# Patient Record
Sex: MALE | Race: WHITE | NOT HISPANIC OR LATINO | Employment: FULL TIME | URBAN - METROPOLITAN AREA
[De-identification: names, ages, dates, MRNs, and addresses within clinical notes are randomized per-mention and may not be internally consistent; named-entity substitution may affect disease eponyms.]

---

## 2017-03-10 ENCOUNTER — OFFICE VISIT - HEALTHEAST (OUTPATIENT)
Dept: INTERNAL MEDICINE | Facility: CLINIC | Age: 57
End: 2017-03-10

## 2017-03-10 DIAGNOSIS — R59.9 LYMPH NODE ENLARGEMENT: ICD-10-CM

## 2017-03-10 DIAGNOSIS — H61.22 IMPACTED CERUMEN OF LEFT EAR: ICD-10-CM

## 2017-03-10 DIAGNOSIS — Z12.5 PROSTATE CANCER SCREENING: ICD-10-CM

## 2017-03-10 LAB — PSA SERPL-MCNC: 1 NG/ML (ref 0–3.5)

## 2017-03-10 ASSESSMENT — MIFFLIN-ST. JEOR: SCORE: 1720.25

## 2017-03-14 ENCOUNTER — AMBULATORY - HEALTHEAST (OUTPATIENT)
Dept: LAB | Facility: CLINIC | Age: 57
End: 2017-03-14

## 2017-03-14 DIAGNOSIS — Z12.11 SPECIAL SCREENING FOR MALIGNANT NEOPLASMS, COLON: ICD-10-CM

## 2017-03-20 ENCOUNTER — COMMUNICATION - HEALTHEAST (OUTPATIENT)
Dept: INTERNAL MEDICINE | Facility: CLINIC | Age: 57
End: 2017-03-20

## 2017-04-18 ENCOUNTER — OFFICE VISIT - HEALTHEAST (OUTPATIENT)
Dept: INTERNAL MEDICINE | Facility: CLINIC | Age: 57
End: 2017-04-18

## 2017-04-18 ENCOUNTER — COMMUNICATION - HEALTHEAST (OUTPATIENT)
Dept: INTERNAL MEDICINE | Facility: CLINIC | Age: 57
End: 2017-04-18

## 2017-04-18 DIAGNOSIS — R59.9 LYMPH NODE ENLARGEMENT: ICD-10-CM

## 2017-04-26 ENCOUNTER — HOSPITAL ENCOUNTER (OUTPATIENT)
Dept: CT IMAGING | Facility: CLINIC | Age: 57
Discharge: HOME | End: 2017-04-26
Attending: INTERNAL MEDICINE

## 2017-04-26 DIAGNOSIS — R59.9 LYMPH NODE ENLARGEMENT: ICD-10-CM

## 2017-04-27 ENCOUNTER — COMMUNICATION - HEALTHEAST (OUTPATIENT)
Dept: INTERNAL MEDICINE | Facility: CLINIC | Age: 57
End: 2017-04-27

## 2017-04-27 DIAGNOSIS — D49.0 PAROTID TUMOR: ICD-10-CM

## 2017-04-27 DIAGNOSIS — R59.9 ENLARGED LYMPH NODE: ICD-10-CM

## 2017-05-03 ENCOUNTER — RECORDS - HEALTHEAST (OUTPATIENT)
Dept: ADMINISTRATIVE | Facility: OTHER | Age: 57
End: 2017-05-03

## 2017-05-04 ENCOUNTER — RECORDS - HEALTHEAST (OUTPATIENT)
Dept: ADMINISTRATIVE | Facility: OTHER | Age: 57
End: 2017-05-04

## 2017-05-08 ENCOUNTER — HOSPITAL ENCOUNTER (OUTPATIENT)
Dept: ULTRASOUND IMAGING | Facility: CLINIC | Age: 57
Discharge: HOME | End: 2017-05-08
Attending: OTOLARYNGOLOGY

## 2017-05-08 DIAGNOSIS — R59.0 CERVICAL LYMPHADENOPATHY: ICD-10-CM

## 2017-05-10 LAB
LAB AP CHARGES (HE HISTORICAL CONVERSION): ABNORMAL
LAB AP INITIAL CYTO EVAL (HE HISTORICAL CONVERSION): ABNORMAL
LAB MED GENERAL PATH INTERP (HE HISTORICAL CONVERSION): ABNORMAL
PATH REPORT.COMMENTS IMP SPEC: ABNORMAL
PATH REPORT.COMMENTS IMP SPEC: ABNORMAL
PATH REPORT.FINAL DX SPEC: ABNORMAL
PATH REPORT.MICROSCOPIC SPEC OTHER STN: ABNORMAL
PATH REPORT.RELEVANT HX SPEC: ABNORMAL
RESULT FLAG (HE HISTORICAL CONVERSION): ABNORMAL
SPECIMEN DESCRIPTION: ABNORMAL

## 2017-05-16 ENCOUNTER — COMMUNICATION - HEALTHEAST (OUTPATIENT)
Dept: INTERNAL MEDICINE | Facility: CLINIC | Age: 57
End: 2017-05-16

## 2017-05-24 ENCOUNTER — COMMUNICATION - HEALTHEAST (OUTPATIENT)
Dept: INTERNAL MEDICINE | Facility: CLINIC | Age: 57
End: 2017-05-24

## 2017-05-24 ENCOUNTER — OFFICE VISIT - HEALTHEAST (OUTPATIENT)
Dept: INTERNAL MEDICINE | Facility: CLINIC | Age: 57
End: 2017-05-24

## 2017-05-24 DIAGNOSIS — D17.9 LIPOMA: ICD-10-CM

## 2017-05-24 DIAGNOSIS — Z01.818 PRE-OPERATIVE EXAMINATION FOR INTERNAL MEDICINE: ICD-10-CM

## 2017-05-24 ASSESSMENT — MIFFLIN-ST. JEOR: SCORE: 1720.25

## 2017-05-25 ENCOUNTER — SURGERY - HEALTHEAST (OUTPATIENT)
Dept: SURGERY | Facility: CLINIC | Age: 57
End: 2017-05-25

## 2017-05-25 ENCOUNTER — ANESTHESIA - HEALTHEAST (OUTPATIENT)
Dept: SURGERY | Facility: CLINIC | Age: 57
End: 2017-05-25

## 2017-05-25 LAB
ATRIAL RATE - MUSE: 85 BPM
DIASTOLIC BLOOD PRESSURE - MUSE: NORMAL MMHG
INTERPRETATION ECG - MUSE: NORMAL
P AXIS - MUSE: 62 DEGREES
PR INTERVAL - MUSE: 138 MS
QRS DURATION - MUSE: 102 MS
QT - MUSE: 372 MS
QTC - MUSE: 442 MS
R AXIS - MUSE: 59 DEGREES
SYSTOLIC BLOOD PRESSURE - MUSE: NORMAL MMHG
T AXIS - MUSE: 57 DEGREES
VENTRICULAR RATE- MUSE: 85 BPM

## 2017-05-25 ASSESSMENT — MIFFLIN-ST. JEOR: SCORE: 1689.86

## 2017-05-30 ENCOUNTER — OFFICE VISIT - HEALTHEAST (OUTPATIENT)
Dept: INTERNAL MEDICINE | Facility: CLINIC | Age: 57
End: 2017-05-30

## 2017-05-30 DIAGNOSIS — T14.8XXA HEMATOMA: ICD-10-CM

## 2017-06-06 ENCOUNTER — OFFICE VISIT - HEALTHEAST (OUTPATIENT)
Dept: OTOLARYNGOLOGY | Facility: CLINIC | Age: 57
End: 2017-06-06

## 2017-06-06 ASSESSMENT — MIFFLIN-ST. JEOR: SCORE: 1717.98

## 2017-06-07 ENCOUNTER — COMMUNICATION - HEALTHEAST (OUTPATIENT)
Dept: ONCOLOGY | Facility: HOSPITAL | Age: 57
End: 2017-06-07

## 2017-06-13 ENCOUNTER — OFFICE VISIT - HEALTHEAST (OUTPATIENT)
Dept: OTOLARYNGOLOGY | Facility: CLINIC | Age: 57
End: 2017-06-13

## 2017-06-13 DIAGNOSIS — R06.02 SHORTNESS OF BREATH: ICD-10-CM

## 2017-06-13 ASSESSMENT — MIFFLIN-ST. JEOR: SCORE: 1717.98

## 2017-06-28 ENCOUNTER — OFFICE VISIT - HEALTHEAST (OUTPATIENT)
Dept: RADIATION ONCOLOGY | Facility: CLINIC | Age: 57
End: 2017-06-28

## 2017-06-28 ASSESSMENT — MIFFLIN-ST. JEOR: SCORE: 1696.66

## 2017-07-06 ENCOUNTER — COMMUNICATION - HEALTHEAST (OUTPATIENT)
Dept: RADIATION ONCOLOGY | Facility: HOSPITAL | Age: 57
End: 2017-07-06

## 2017-07-07 ENCOUNTER — HOSPITAL ENCOUNTER (OUTPATIENT)
Dept: PET IMAGING | Facility: HOSPITAL | Age: 57
Setting detail: RADIATION/ONCOLOGY SERIES
Discharge: STILL ADMITTED OR ADMINISTRATIVE | End: 2017-07-07
Attending: RADIOLOGY

## 2017-07-07 DIAGNOSIS — C80.1 MALIGNANT (PRIMARY) NEOPLASM, UNSPECIFIED (H): ICD-10-CM

## 2017-07-12 ENCOUNTER — AMBULATORY - HEALTHEAST (OUTPATIENT)
Dept: RADIATION ONCOLOGY | Facility: HOSPITAL | Age: 57
End: 2017-07-12

## 2017-07-12 ENCOUNTER — AMBULATORY - HEALTHEAST (OUTPATIENT)
Dept: ONCOLOGY | Facility: HOSPITAL | Age: 57
End: 2017-07-12

## 2017-07-12 LAB
CREAT SERPL-MCNC: 0.91 MG/DL (ref 0.7–1.3)
GFR SERPL CREATININE-BSD FRML MDRD: >60 ML/MIN/1.73M2

## 2017-07-20 ENCOUNTER — COMMUNICATION - HEALTHEAST (OUTPATIENT)
Dept: ADMINISTRATIVE | Facility: HOSPITAL | Age: 57
End: 2017-07-20

## 2017-07-20 ENCOUNTER — COMMUNICATION - HEALTHEAST (OUTPATIENT)
Dept: ONCOLOGY | Facility: HOSPITAL | Age: 57
End: 2017-07-20

## 2017-07-26 ENCOUNTER — OFFICE VISIT - HEALTHEAST (OUTPATIENT)
Dept: RADIATION ONCOLOGY | Facility: CLINIC | Age: 57
End: 2017-07-26

## 2017-07-26 ASSESSMENT — MIFFLIN-ST. JEOR: SCORE: 1715.71

## 2017-07-31 ENCOUNTER — AMBULATORY - HEALTHEAST (OUTPATIENT)
Dept: ONCOLOGY | Facility: HOSPITAL | Age: 57
End: 2017-07-31

## 2017-08-02 ENCOUNTER — OFFICE VISIT - HEALTHEAST (OUTPATIENT)
Dept: RADIATION ONCOLOGY | Facility: CLINIC | Age: 57
End: 2017-08-02

## 2017-08-02 ASSESSMENT — MIFFLIN-ST. JEOR: SCORE: 1709.36

## 2017-08-07 ENCOUNTER — AMBULATORY - HEALTHEAST (OUTPATIENT)
Dept: ONCOLOGY | Facility: HOSPITAL | Age: 57
End: 2017-08-07

## 2017-08-09 ENCOUNTER — OFFICE VISIT - HEALTHEAST (OUTPATIENT)
Dept: RADIATION ONCOLOGY | Facility: CLINIC | Age: 57
End: 2017-08-09

## 2017-08-16 ENCOUNTER — AMBULATORY - HEALTHEAST (OUTPATIENT)
Dept: ONCOLOGY | Facility: HOSPITAL | Age: 57
End: 2017-08-16

## 2017-08-16 ENCOUNTER — OFFICE VISIT - HEALTHEAST (OUTPATIENT)
Dept: RADIATION ONCOLOGY | Facility: CLINIC | Age: 57
End: 2017-08-16

## 2017-08-16 ASSESSMENT — MIFFLIN-ST. JEOR: SCORE: 1700.74

## 2017-08-23 ENCOUNTER — OFFICE VISIT - HEALTHEAST (OUTPATIENT)
Dept: RADIATION ONCOLOGY | Facility: CLINIC | Age: 57
End: 2017-08-23

## 2017-08-30 ENCOUNTER — AMBULATORY - HEALTHEAST (OUTPATIENT)
Dept: RADIATION ONCOLOGY | Facility: CLINIC | Age: 57
End: 2017-08-30

## 2017-08-30 ENCOUNTER — OFFICE VISIT - HEALTHEAST (OUTPATIENT)
Dept: RADIATION ONCOLOGY | Facility: CLINIC | Age: 57
End: 2017-08-30

## 2017-09-01 ENCOUNTER — AMBULATORY - HEALTHEAST (OUTPATIENT)
Dept: RADIATION ONCOLOGY | Facility: CLINIC | Age: 57
End: 2017-09-01

## 2017-09-12 ENCOUNTER — COMMUNICATION - HEALTHEAST (OUTPATIENT)
Dept: RADIATION ONCOLOGY | Facility: CLINIC | Age: 57
End: 2017-09-12

## 2017-09-28 ENCOUNTER — OFFICE VISIT - HEALTHEAST (OUTPATIENT)
Dept: RADIATION ONCOLOGY | Facility: CLINIC | Age: 57
End: 2017-09-28

## 2017-09-28 ASSESSMENT — MIFFLIN-ST. JEOR: SCORE: 1675.8

## 2017-10-03 ENCOUNTER — OFFICE VISIT - HEALTHEAST (OUTPATIENT)
Dept: OTOLARYNGOLOGY | Facility: CLINIC | Age: 57
End: 2017-10-03

## 2017-12-05 ENCOUNTER — OFFICE VISIT - HEALTHEAST (OUTPATIENT)
Dept: OTOLARYNGOLOGY | Facility: CLINIC | Age: 57
End: 2017-12-05

## 2017-12-15 ENCOUNTER — HOSPITAL ENCOUNTER (OUTPATIENT)
Dept: PET IMAGING | Facility: HOSPITAL | Age: 57
Discharge: HOME | End: 2017-12-15
Attending: OTOLARYNGOLOGY

## 2017-12-15 DIAGNOSIS — C44.92 SQUAMOUS CELL CARCINOMA OF SKIN, UNSPECIFIED: ICD-10-CM

## 2018-01-02 ENCOUNTER — OFFICE VISIT - HEALTHEAST (OUTPATIENT)
Dept: OTOLARYNGOLOGY | Facility: CLINIC | Age: 58
End: 2018-01-02

## 2018-03-06 ENCOUNTER — OFFICE VISIT - HEALTHEAST (OUTPATIENT)
Dept: OTOLARYNGOLOGY | Facility: CLINIC | Age: 58
End: 2018-03-06

## 2018-04-03 ENCOUNTER — OFFICE VISIT - HEALTHEAST (OUTPATIENT)
Dept: OTOLARYNGOLOGY | Facility: CLINIC | Age: 58
End: 2018-04-03

## 2018-05-08 ENCOUNTER — OFFICE VISIT - HEALTHEAST (OUTPATIENT)
Dept: OTOLARYNGOLOGY | Facility: CLINIC | Age: 58
End: 2018-05-08

## 2018-06-12 ENCOUNTER — OFFICE VISIT - HEALTHEAST (OUTPATIENT)
Dept: OTOLARYNGOLOGY | Facility: CLINIC | Age: 58
End: 2018-06-12

## 2018-07-17 ENCOUNTER — OFFICE VISIT - HEALTHEAST (OUTPATIENT)
Dept: OTOLARYNGOLOGY | Facility: CLINIC | Age: 58
End: 2018-07-17

## 2018-08-14 ENCOUNTER — OFFICE VISIT - HEALTHEAST (OUTPATIENT)
Dept: OTOLARYNGOLOGY | Facility: CLINIC | Age: 58
End: 2018-08-14

## 2018-08-22 ENCOUNTER — HOSPITAL ENCOUNTER (OUTPATIENT)
Dept: PET IMAGING | Facility: HOSPITAL | Age: 58
Discharge: HOME | End: 2018-08-22
Attending: OTOLARYNGOLOGY

## 2018-08-22 DIAGNOSIS — C44.92 SQUAMOUS CELL CARCINOMA OF SKIN, UNSPECIFIED: ICD-10-CM

## 2018-08-22 LAB — GLUCOSE BLDC GLUCOMTR-MCNC: 113 MG/DL

## 2018-08-22 ASSESSMENT — MIFFLIN-ST. JEOR: SCORE: 1686.23

## 2018-09-11 ENCOUNTER — OFFICE VISIT - HEALTHEAST (OUTPATIENT)
Dept: OTOLARYNGOLOGY | Facility: CLINIC | Age: 58
End: 2018-09-11

## 2020-01-27 ENCOUNTER — COMMUNICATION - HEALTHEAST (OUTPATIENT)
Dept: TELEHEALTH | Facility: CLINIC | Age: 60
End: 2020-01-27

## 2020-01-27 ENCOUNTER — RECORDS - HEALTHEAST (OUTPATIENT)
Dept: GENERAL RADIOLOGY | Facility: CLINIC | Age: 60
End: 2020-01-27

## 2020-01-27 ENCOUNTER — OFFICE VISIT - HEALTHEAST (OUTPATIENT)
Dept: INTERNAL MEDICINE | Facility: CLINIC | Age: 60
End: 2020-01-27

## 2020-01-27 DIAGNOSIS — M54.2 CERVICALGIA: ICD-10-CM

## 2020-01-27 DIAGNOSIS — Z12.11 SCREEN FOR COLON CANCER: ICD-10-CM

## 2020-01-27 DIAGNOSIS — C77.9 METASTATIC SQUAMOUS CELL CARCINOMA TO LYMPH NODE (H): ICD-10-CM

## 2020-01-27 ASSESSMENT — MIFFLIN-ST. JEOR: SCORE: 1727.05

## 2021-05-30 VITALS — WEIGHT: 201.5 LBS | BODY MASS INDEX: 28.91 KG/M2

## 2021-05-30 VITALS — BODY MASS INDEX: 27.89 KG/M2 | HEIGHT: 70 IN | WEIGHT: 194.8 LBS

## 2021-05-30 VITALS — HEIGHT: 70 IN | BODY MASS INDEX: 28.77 KG/M2 | WEIGHT: 201 LBS

## 2021-05-30 VITALS — HEIGHT: 70 IN | WEIGHT: 201.5 LBS | BODY MASS INDEX: 28.85 KG/M2

## 2021-05-30 VITALS — WEIGHT: 200 LBS | BODY MASS INDEX: 28.7 KG/M2

## 2021-05-31 VITALS — WEIGHT: 200.5 LBS | HEIGHT: 70 IN | BODY MASS INDEX: 28.71 KG/M2

## 2021-05-31 VITALS — WEIGHT: 199 LBS | BODY MASS INDEX: 28.55 KG/M2

## 2021-05-31 VITALS — HEIGHT: 70 IN | WEIGHT: 199.1 LBS | BODY MASS INDEX: 28.5 KG/M2

## 2021-05-31 VITALS — HEIGHT: 70 IN | WEIGHT: 191.7 LBS | BODY MASS INDEX: 27.44 KG/M2

## 2021-05-31 VITALS — WEIGHT: 194.4 LBS | BODY MASS INDEX: 27.89 KG/M2

## 2021-05-31 VITALS — BODY MASS INDEX: 28.1 KG/M2 | HEIGHT: 70 IN | WEIGHT: 196.3 LBS

## 2021-05-31 VITALS — BODY MASS INDEX: 28.09 KG/M2 | WEIGHT: 195.8 LBS

## 2021-05-31 VITALS — BODY MASS INDEX: 28.23 KG/M2 | HEIGHT: 70 IN | WEIGHT: 197.2 LBS

## 2021-05-31 VITALS — BODY MASS INDEX: 28.77 KG/M2 | WEIGHT: 201 LBS | HEIGHT: 70 IN

## 2021-06-01 VITALS — WEIGHT: 194 LBS | BODY MASS INDEX: 27.77 KG/M2 | HEIGHT: 70 IN

## 2021-06-04 VITALS
DIASTOLIC BLOOD PRESSURE: 72 MMHG | HEIGHT: 70 IN | HEART RATE: 78 BPM | SYSTOLIC BLOOD PRESSURE: 124 MMHG | WEIGHT: 203 LBS | BODY MASS INDEX: 29.06 KG/M2 | OXYGEN SATURATION: 98 %

## 2021-06-05 NOTE — PROGRESS NOTES
ASSESSMENT AND PLAN:    1. 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  Clinically and on xray no indication of recurrence.     2. Cervicalgia  This is myofascial and a postural strain related likely to his 9 hours per day .  Xrays reveal DJD normal for age, no indication of fracture, or tumor, or malalignment. He plans to see chiropractor, and will consider yoga or exercise for prevention.   - XR Cervical Spine 2 - 3 VWS; Future  - XR Thoracic Spine 2 VWS; Future    3. Preventive health  He agrees to be seen for general health evaluation later this year, and will schedule fasting appointment.     Patient Instructions   1. Discussed the postural syndrome.  Chiropractic can help. Follow up if fails to improve.     2. Follow up later this year for physical.     CHIEF COMPLAINT:  Chief Complaint   Patient presents with     Neck Pain     HISTORY OF PRESENT ILLNESS:  Lanre Godwin is a 59 y.o. male presents with neck pain.  This is localized to the low neck and upper central thoracic spine.  There are no arm symptoms of tingling or weakness.  He does drive truck 9 hours per day.  No trauma.  He feels that he often has to rotate and bend his neck frequently to 'loosen it up'.  Otherwise he feels well.     REVIEW OF SYSTEMS:   See HPI, all other systems on review are negative.    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 (H) 2020     Skin cancer 2016     Social History     Tobacco Use   Smoking Status Former Smoker     Packs/day: 1.00     Types: Cigarettes     Start date:      Last attempt to quit:      Years since quittin.0   Smokeless Tobacco Never Used     Family History   Problem Relation Age of Onset     Prostate cancer Father      Anesthesia problems Neg Hx      Past Surgical History:   Procedure  "Laterality Date     HEAD & NECK SKIN LESION EXCISIONAL BIOPSY      Keratoacanthoma     PAROTIDECTOMY Left 5/25/2017    Procedure: LEFT PAROTIDECTOMY;  Surgeon: Natalee Valenzuela MD;  Location: [facility];  Service:      RADICAL NECK DISSECTION Left 5/25/2017    Procedure: LEFT MODIFIED RADIAL NECK DISSECTION;  Surgeon: Natalee Valenzuela MD;  Location: [facility];  Service:      VITALS:  Vitals:    01/27/20 0901   BP: 124/72   Patient Site: Left Arm   Patient Position: Sitting   Cuff Size: Adult Regular   Pulse: 78   SpO2: 98%   Weight: 203 lb (92.1 kg)   Height: 5' 10\" (1.778 m)     Wt Readings from Last 3 Encounters:   01/27/20 203 lb (92.1 kg)   08/22/18 194 lb (88 kg)   09/28/17 191 lb 11.2 oz (87 kg)     PHYSICAL EXAM:  Constitutional:  In NAD, alert and oriented  Neck: no cervical or axillary adenopathy, healed left parotidectomy site.  Good ROM without elicitation of pain.  Some paracervical muscle spasm.      DECISION TO OBTAIN OLD RECORDS AND/OR OBTAIN HISTORY FROM SOMEONE OTHER THAN PATIENT, AND/OR ACCESSING CARE EVERYWHERE):  1  0     REVIEW AND SUMMARIZATION OF OLD RECORDS, AND/OR OBTAINING HISTORY FROM SOMEONE OTHER THAN PATIENT, AND/OR DISCUSSION OF CASE WITH ANOTHER HEALTH CARE PROVIDER:  2 0    REVIEW AND/OR ORDER OF OF CLINICAL LAB TESTS: 1 0    REVIEW AND/OR ORDER OF RADIOLOGY TESTS: 1 ordered    REVIEW AND/OR ORDER OF MEDICAL TESTS (EKG/ECHO/COLONOSCOPY/EGD): 1  0    INDEPENDENT  VISUALIZATION OF IMAGE, TRACING, OR SPECIMEN ITSELF (2 EACH):  4 personally viewed and interpreted the thoracic spine and cervical spine xrays.     TOTAL: 5    Burt Mejia MD  Internal Medicine  [facility]  "

## 2021-06-05 NOTE — PATIENT INSTRUCTIONS - HE
1. Discussed the postural syndrome.  Chiropractic can help. Follow up if fails to improve.     2. Follow up later this year for physical.

## 2021-06-09 NOTE — PROGRESS NOTES
ASSESSMENT:  1. Prostate cancer screening  Lanre Godwin is a 56-year-old man who presents to establish care.  His father had prostate cancer, paternal grandfather also had prostate cancer.  He occasionally has weak stream, but rectal exam is normal, AUA prostate score is fairly very low.  I recommended annual PSA screening for him.  - PSA (Prostatic-Specific Antigen), Annual Screen    2. Impacted cerumen of left ear  Attempted removal today, irrigated and clinic    3. Lymph node enlargement  Small BB sized lymph node at the angle of the left mandible representing shotty lymph node versus reactive lymph node.  It is mobile, tender, benign characteristics.  Check ultrasound if no improvement in 1 month, or if enlarging.         PLAN:  Patient Instructions   PSA screening today    Stool cards-- once a year for colon cancer screening    At a later date-- recommend fasting blood tests for diabetes and cholesterol screening       Orders Placed This Encounter   Procedures     PSA (Prostatic-Specific Antigen), Annual Screen     There are no discontinued medications.    Return for Annual physical.    CHIEF COMPLAINT:  Chief Complaint   Patient presents with     Lump on Left side of neck     Wants Prostate test     Establish Care       HISTORY OF PRESENT ILLNESS:  Lanre is a 56 y.o. male presenting to the clinic today to establish care and to discuss routine testing for prostate cancer. His father has prostate cancer currently being treated with radiation and his great grandfather also had prostate cancer. He does have an occasional weak stream, though he notes this is usually when he is trying to empty his bladder prior to driving versus at a time when he feels more urgent.     Health Maintenance: He is agreeable to cologuard. He is squeamish about colonoscopy.     REVIEW OF SYSTEMS:   He has had a skin cancer removed from his forehead.   He does back stretching exercises 5 days per week to keep his back supple. He  "works as a .   He would like an evaluation of a lump recently discovered on the left side of his neck. It is slightly tender.   All other systems are negative.    PFSH:  Father with prostate cancer currently being treated by radiation. Great grandfather also with prostate cancer although he lived a long life. He is a . 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 He has one daughter and 2 grandchildren. Other family history includes sister with brain abscess with shunt last year, father is healthy other than the cancer. He reports that he used to drink scotch and smoke, but he no longer does.   History   Smoking Status     Former Smoker   Smokeless Tobacco     Not on file       No family history on file.    Social History     Social History     Marital status:      Spouse name: N/A     Number of children: N/A     Years of education: N/A     Occupational History     Not on file.     Social History Main Topics     Smoking status: Former Smoker     Smokeless tobacco: Not on file     Alcohol use Not on file     Drug use: Not on file     Sexual activity: Not on file     Other Topics Concern     Not on file     Social History Narrative     No narrative on file       No past surgical history on file.    No Known Allergies    Active Ambulatory Problems     Diagnosis Date Noted     Prostate cancer screening 03/10/2017     Impacted cerumen of left ear 03/10/2017     Lymph node enlargement 03/10/2017     Resolved Ambulatory Problems     Diagnosis Date Noted     No Resolved Ambulatory Problems     No Additional Past Medical History       VITALS:  Vitals:    03/10/17 1053   BP: 110/66   Pulse: 80   Weight: 201 lb 8 oz (91.4 kg)   Height: 5' 10\" (1.778 m)     Wt Readings from Last 3 Encounters:   03/10/17 201 lb 8 oz (91.4 kg)     Body mass index is 28.91 kg/(m^2).    PHYSICAL EXAM:  General: Alert and talkative. Well-appearing man.   Ears: Left occluded with cerumen.   Mouth: Good dentition. "   Lymphatic: Firm BB at the angle of the left mandible, mobile.   Heart: Regular rate and rhythm. No murmur.   Lungs: Clear to ausculation.   : Prostate normal in contour; no nodules.   Psych: Pleasant affect.     ADDITIONAL HISTORY SUMMARIZED (2): None.  DECISION TO OBTAIN EXTRA INFORMATION (1): None.   RADIOLOGY TESTS (1): None.  LABS (1): Labs ordered.   MEDICINE TESTS (1): None.  INDEPENDENT REVIEW (2 each): None.       The visit lasted a total of 21 minutes face to face with the patient. Over 50% of the time was spent counseling and educating the patient about prostate cancer.    Omar FERRARI, am scribing for and in the presence of, Dr. Taylor.    IDr. Taylor, personally performed the services described in this documentation, as scribed by Omar Fiore in my presence, and it is both accurate and complete.    MEDICATIONS:  No current outpatient prescriptions on file.     No current facility-administered medications for this visit.        Total data points: 1

## 2021-06-10 NOTE — PROGRESS NOTES
ASSESSMENT:  1. Pre-operative examination for internal medicine  Lanre Godwin is a 56-year-old man who presents for preop assessment prior to left parotidectomy and left neck dissection.  Medical risks optimize, no further testing needed aside from hemogram.  EKG was normal.  He does not have symptoms of angina, RCRI score is 0.  He does not take any prescription medications, and was already advised to not take aspirin or NSAIDs prior to the surgery.  - Electrocardiogram Perform and Read  - HM2(CBC w/o Differential)    2. Lipoma  Is a small lipoma over the left acromion as well as a larger one in the center is back.  The one on the back has been present for many years.  I would recommend elective removal after recovery from #1      PLAN:  Patient Instructions   Good for surgery    Check hemoglobin today     Lipoma on left shoulder and back      Orders Placed This Encounter   Procedures     Tdap vaccine,  6yo or older,  IM     HM2(CBC w/o Differential)     Electrocardiogram Perform and Read     There are no discontinued medications.    Return for Next scheduled follow up.    CHIEF COMPLAINT:  Chief Complaint   Patient presents with     Pre-op Exam       HISTORY OF PRESENT ILLNESS:  Lanre is a 56 y.o. male here for an internal medicine pre-operative consultation. The exam is requested by Dr. Valenzuela in preparation for left parotidectomy to be performed at [facility] on May 25th, 2017. Today s examination on 5/24/2017 is done to review the underlying surgical condition of malignant neoplasm of the neck, clear for anesthesia, and review medical problems with appropriate changes in medications.    He saw Dr. Tierney earlier this month after the small growth on the left neck grew. The neck has been tender when he rests his head, preventing much restful sleep for the past few weeks. Dr. Tierney noted a prominent firm left submandibular node along with large posterior cervical lymph node on the left and prominent  swelling anterior to the left ear as well.   He had a CT which showed 14 mm, ill-defined, likely partially necrotic soft tissue lesion within the superficial left parotid gland suspicious for either intraparotid jose e metastasis or primary parotid neoplasm.   Additionally, there was a large, presumably necrotic, lymph node conglomerate in the left spinal accessory jose e chain deep to the left upper sternocleidomastoid muscle seen on the CT. Both aspiration smears showed tumor tissue with conventional keratinizing squamous cell carcinoma.     Lanre has tolerated previous surgeries well without bleeding or anesthesia difficulty.      REVIEW OF SYSTEMS:   As above, he has been feeling tired due to lack of sleep. He denies any shortness of breath, cough or chest pain.   Lipoma:  He has noticed a small protrusion on his left shoulder. Upon palpitation, it feels as though there is fluid beneath the small protrusion, very similar to lipomas he has had in the past.   Anxiety:  He becomes a bit short of breath when feeling anxious. He denies any decreased exercise tolerance.   All other systems are negative.    PFSH:  He has no history of surgery.   History   Smoking Status     Former Smoker     Packs/day: 1.00     Types: Cigarettes     Start date: 1980     Quit date: 2007   Smokeless Tobacco     Not on file       Family History   Problem Relation Age of Onset     Prostate cancer Father      Anesthesia problems Neg Hx        Past Surgical History:   Procedure Laterality Date     HEAD & NECK SKIN LESION EXCISIONAL BIOPSY      Keratoacanthoma       No Known Allergies    Active Ambulatory Problems     Diagnosis Date Noted     Prostate cancer screening 03/10/2017     Impacted cerumen of left ear 03/10/2017     Lymph node enlargement 03/10/2017     Lipoma 05/24/2017     Resolved Ambulatory Problems     Diagnosis Date Noted     No Resolved Ambulatory Problems     Past Medical History:   Diagnosis Date     Keratoacanthoma      Skin  "cancer 2016       VITALS:  Vitals:    05/24/17 0939   BP: 112/60   Pulse: 88   Weight: 201 lb 8 oz (91.4 kg)   Height: 5' 10\" (1.778 m)     Wt Readings from Last 3 Encounters:   05/24/17 201 lb 8 oz (91.4 kg)   04/18/17 200 lb (90.7 kg)   03/10/17 201 lb 8 oz (91.4 kg)       PHYSICAL EXAM:  General:  Alert, pleasant, no distress   ENT: Good dentition.   Neck:  Large firm mass at the angle of the left mandible and just posterior to that.   Heart:  Regular rate and rhythm, no murmurs.   Lungs:  Clear.    Skin: Large lipoma in the center of the back. Small 1 cm lipoma over the left acromion.   EKG: Normal sinus rhythm, no ischemic changes or conduction abnormalities.     ADDITIONAL HISTORY SUMMARIZED (2): Notes from Dr. Tierney reviewed from 5/2017 regarding neck swelling.  DECISION TO OBTAIN EXTRA INFORMATION (1): None.   RADIOLOGY TESTS (1): CT of the neck reviewed from 4/2017. US reviewed from 5/8/2017.  LABS (1): Labs ordered.  MEDICINE TESTS (1): EKG ordered.  INDEPENDENT REVIEW (2 each): EKG personally interpreted.     The visit lasted a total of 20 minutes face to face with the patient. Over 50% of the time was spent counseling and educating the patient about pre-operative measures.    IAgustín, am scribing for and in the presence of Dr. Taylor.    IDr. Taylor, personally performed the services described in this documentation, as scribed by Agustín Land in my presence, and it is both accurate and complete.    MEDICATIONS:  No current outpatient prescriptions on file.     No current facility-administered medications for this visit.        Total data points: 7.     Agustín Taylor, DO  Internal Medicine  [facility]    "

## 2021-06-10 NOTE — PROGRESS NOTES
ASSESSMENT:  1. 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  ARCHIE drain was removed in clinic today around 9:30 AM, but he presented around 11:45 AM has a walk-in asking for Dr. Valenzuela.  It was reported that she had left for the day, and I saw him myself.  He described rapid increase in swelling around the parotid gland, also blood coming from somewhere on the side of his neck.  He could not tell where.  Exam reveals fresh bright red blood from the site of the drain, could be expressed, but was not briskly bleeding.  I dressed this with a an abdominal pad, and also dressed a small area of oozing just inferior to the left ear.  Clinically, symptoms and signs are consistent with hematoma.  I taken pictures for reference.  I did discuss with Dr. Valenzuela, who arranged appointment with 1 of her partners in her private office near [facility].    2. Squamous cell carcinoma  Pathology pending from surgery on 5/25        PLAN:  See Dr. Valenzuela's partner, address provided    No orders of the defined types were placed in this encounter.    Medications Discontinued During This Encounter   Medication Reason     HYDROcodone-acetaminophen 5-325 mg per tablet Therapy completed       Return if symptoms worsen or fail to improve, for Next scheduled follow up.    CHIEF COMPLAINT:  Chief Complaint   Patient presents with     Jaw Pain     jaw swelling and bleeding from incision site       HISTORY OF PRESENT ILLNESS:  Lanre is a 56 y.o. male presenting to the clinic today with complaints of jaw pain. He recently underwent left parotidectomy and left neck dissection on 5/25/2017 by Dr. Valenzuela. The surgery revealed diffuse lymphadenopathy involving the parotid jose e basin, jugulodigastric chain, and posterolateral neck on the left. He had the drain site removed this morning and about 20  "minutes later, at around 9 AM, he began to experience swelling and pain at the incision site and noticed blood being expressed from the incision site. He tried to staunch the blood flow with paper towels, roughly 6 of them, but had some difficulty fully stopping the blood flow. Of note, he took an ibuprofen earlier this morning. During the office visit he reports that the pain has lessened significantly and near the end of the visit the jaw is more numb than painful.     REVIEW OF SYSTEMS:   Since the operation, chewing has been difficult and his jaw has felt \"off\" but overall he has not had a great deal of pain.   All other systems are negative.    PFSH:  Reviewed, as above.     TOBACCO USE:  History   Smoking Status     Former Smoker     Packs/day: 1.00     Types: Cigarettes     Start date: 1980     Quit date: 2007   Smokeless Tobacco     Never Used       VITALS:  Vitals:    05/30/17 1144   BP: 124/70   Pulse: 68   Weight: 201 lb 8 oz (91.4 kg)     Wt Readings from Last 3 Encounters:   05/30/17 201 lb 8 oz (91.4 kg)   05/25/17 194 lb 12.8 oz (88.4 kg)   05/24/17 201 lb 8 oz (91.4 kg)       PHYSICAL EXAM:  General:  Alert, pleasant.  Skin: 6x7 cm area of diffuse swelling around the angle of the mandible, incision from anterior ear down the SCM appears intact, there is a small amount of fresh blood just inferior to the left ear, fresh red blood can be expressed from the drain site.   Neuro: Slight weakness in left side of the face with smiling.     ADDITIONAL HISTORY SUMMARIZED (2): Operative notes from Dr. Valenzuela from 5/25/2017 reviewed regarding lymph adenopathy.  DECISION TO OBTAIN EXTRA INFORMATION (1): Called placed to Dr. Valenzuela for recommendations on management of bleeding.   RADIOLOGY TESTS (1): None.  LABS (1): Labs from 5/24/2017 reviewed.  MEDICINE TESTS (1): None.  INDEPENDENT REVIEW (2 each): None.     The visit lasted a total of 36 minutes face to face with the patient. Over 50% of the time was spent " counseling and educating the patient about neoplasm.    I, Agustín Land, am scribing for and in the presence of, Dr. Taylor.    I, Dr. Taylor, personally performed the services described in this documentation, as scribed by Agustín Land in my presence, and it is both accurate and complete.    MEDICATIONS:  Current Outpatient Prescriptions   Medication Sig Dispense Refill     cephalexin (KEFLEX) 500 MG capsule Take 1 capsule (500 mg total) by mouth 2 (two) times a day for 5 days. 10 capsule 0     ibuprofen (ADVIL,MOTRIN) 600 MG tablet Take 1 tablet (600 mg total) by mouth every 6 (six) hours as needed for pain. 60 tablet 2     methylPREDNISolone (MEDROL DOSEPACK) 4 mg tablet follow package directions 21 tablet 0     No current facility-administered medications for this visit.        Total data points: 4.     Agustín Taylor,   Internal Medicine  [facility]

## 2021-06-10 NOTE — ANESTHESIA POSTPROCEDURE EVALUATION
Patient: Lanre Godwin  LEFT PAROTIDECTOMY, LEFT MODIFIED RADIAL NECK DISSECTION  Anesthesia type: general    Patient location: PACU  Last vitals:   Vitals:    05/25/17 1815   BP: 118/70   Pulse: 93   Resp: 16   Temp:    SpO2: 99%     Post vital signs: stable  Level of consciousness: awake, alert and responds to simple questions  Post-anesthesia pain: pain controlled  Post-anesthesia nausea and vomiting: no  Pulmonary: unassisted, return to baseline  Cardiovascular: stable and blood pressure at baseline  Hydration: adequate  Anesthetic events: no    QCDR Measures:  ASA# 11 - Jesica-op Cardiac Arrest: ASA11B - Patient did NOT experience unanticipated cardiac arrest  ASA# 12 - Jesica-op Mortality Rate: ASA12B - Patient did NOT die  ASA# 13 - PACU Re-Intubation Rate: ASA13B - Patient did NOT require a new airway mgmt  ASA# 10 - Composite Anes Safety: ASA10A - No serious adverse event  ASA# 38 - New Corneal Injury: ASA38A - No new exposure keratitis or corneal abrasion in PACU    Additional Notes:

## 2021-06-10 NOTE — ANESTHESIA CARE TRANSFER NOTE
Last vitals:   Vitals:    05/25/17 1753   BP: 122/66   Pulse: 91   Resp: 16   Temp: 36.8  C (98.3  F)   SpO2: 99%     Patient's level of consciousness is drowsy  Spontaneous respirations: yes  Maintains airway independently: yes  Dentition unchanged: yes  Oropharynx: oropharynx clear of all foreign objects    QCDR Measures:  ASA# 20 - Surgical Safety Checklist: ASA20A - Safety Checks Done  PQRS# 430 - Adult PONV Prevention: 4558F - Pt received => 2 anti-emetic agents (different classes) preop & intraop  ASA# 8 - Peds PONV Prevention: NA - Not pediatric patient, not GA or 2 or more risk factors NOT present  PQRS# 424 - Jesica-op Temp Management: 4559F - At least one body temp DOCUMENTED => 35.5C or 95.9F within required timeframe  PQRS# 426 - PACU Transfer Protocol: - Transfer of care checklist used  ASA# 14 - Acute Post-op Pain: ASA14B - Patient did NOT experience pain >= 7 out of 10

## 2021-06-10 NOTE — ANESTHESIA PREPROCEDURE EVALUATION
Anesthesia Evaluation      Patient summary reviewed   No history of anesthetic complications     Airway   Mallampati: II  Neck ROM: full   Pulmonary - normal exam    breath sounds clear to auscultation  (+) a smoker  (-) shortness of breath, sleep apnea                         Cardiovascular   Exercise tolerance: > or = 4 METS  (-) angina, murmur  Rhythm: regular  Rate: normal,    no murmur      Neuro/Psych - negative ROS     Endo/Other - negative ROS      GI/Hepatic/Renal    (+) GERD intermittent and well controlled,             Dental - normal exam                        Anesthesia Plan  Planned anesthetic: general endotracheal    ASA 2   Induction: intravenous   Anesthetic plan and risks discussed with: patient  Anesthesia plan special considerations: antiemetics,   Post-op plan: routine recovery

## 2021-06-10 NOTE — PROGRESS NOTES
[facility] Follow Up Note    Assessment/Plan:  1. Lymph node enlargement  Prominent firm left submandibular node along with large posterior cervical lymph node on the left.  Prominent swelling anterior to the left ear as well.  No other associated constitutional symptoms.  Some worry for process.  Workup as below  - HM1(CBC and Differential)  - Ambulatory referral to ENT  - CT Soft Tissue Neck With and Without Contrast; Future  - HM1 (CBC with Diff)  - Comprehensive Metabolic Panel      Follow-up in test complete    Luisa Tierney MD    Chief Complaint:  Chief Complaint   Patient presents with     Mass       History of Present Illness:  Lanre is a 56 y.o. male who is seen here today for evaluation of a prominent lymph node.  Of note, patient had concerns regarding swelling in the left neck approximately 1 month ago.  He is here today for the follow-up after an appropriate observation period.  Of note, he reports that nothing has changed.  He also notes swelling anterior to the left ear as well as in the back of the neck.  He denies any constitutional symptoms of night sweats, fever or chills.  He has not had any weight loss.  He denies any current tooth problems, but does report that he had 3 crowns in the past year.  He denies any difficulty with chewing etc.  Overall, he feels well.  He states his job is physical but he continues to perform well.  He denies any family history of lymphoma.  He denies any personal history of chewing tobacco.  He has a remote history of smoking.    Review of Systems:  A comprehensive review of systems was performed and was otherwise negative    PFSH:  Social History: Smoking history is as described below.  He is a .  History   Smoking Status     Former Smoker     Packs/day: 1.00     Types: Cigarettes     Start date: 1980     Quit date: 2007   Smokeless Tobacco     Not on file       Past History: As noted in the prior note  No current outpatient  prescriptions on file.     No current facility-administered medications for this visit.        Family History: Denies a family history of lymphoma or malignancy    Physical Exam:  General Appearance:   He is pleasant.  He is in no acute distress  Vitals:    04/18/17 0808   BP: 118/72   Patient Site: Left Arm   Patient Position: Sitting   Cuff Size: Adult Regular   Pulse: (!) 102   Weight: 200 lb (90.7 kg)     Wt Readings from Last 3 Encounters:   04/18/17 200 lb (90.7 kg)   03/10/17 201 lb 8 oz (91.4 kg)     Body mass index is 28.7 kg/(m^2).    A head neck exam is performed.  Of note, there is a large hard node at the left submandibular space.  It measures about 2-3 cm.  Additionally, there is fullness anterior to the left ear.  Finally, there is a large posterior cervical node that measures approximately 4-5 cm  Axillary areas examined.  No lymphadenopathy present    Data Review:    Analysis and Summary of Old Records (2): Reviewed Dr. Alvarado's note    Records Requested (1): 0      Other History Summarized (from other people in the room) (2): 0    Radiology Tests Summarized (XRAY/CT/MRI/DXA) (1): Ordered CT    Labs Reviewed (1): Order labs    Medicine Tests Reviewed (EKG/ECHO/COLONOSCOPY/EGD) (1): 0    Independent Review of EKG or X-RAY (2): 0

## 2021-06-11 NOTE — PROGRESS NOTES
Exam: [facility]     Date: 7/12/2017  There were no vitals filed for this visit.    Please ask your patient the following questions before you give any injection of a contrast media. If someone other than the patient is responding to these questions, please indicate the respondent's name and relationship to the patient.    Respondent Name:                  Relationship to patient:    Name: Lanre Mcintosh any allergies: No Known Allergies    Does the patient have renal disease?     No  Does the patient have diabetes?   No  If patient has diabetes, is metformin or metformin combination drug currently prescribed (i.e. Actoplus Met, Avandmet, Fortamet, Glucophage, Glucovance, Glumetza, Junamet, Prandimet, Metaglip, or Riomet)?       No  Is the patient pregnant? No  Is the patient on dialysis? 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 No  When was the patient diagnosed? current  Treatment: [facility] for XRT  Date of last chemo treatment:     LAB RESULTS       CREATININE       Lab Results   Component Value Date    CREATININE 0.91 07/12/2017           (Normal Range: Male: 0.6-1.5 mg/dL  Female: 0.5-1.3mg/dL)   (A Creatinine result greater than 6.0 mg/dL is a Critical value-the ordering provider must be   notified)        LASTLAB(EGFR,GFRNONAA,GFRAA)@ ml/min/1.73M2   (Normal Range >60)         CT Only  If the eGFR is less than 30 the radiologist must be informed  If labs are abnormal, was the physician informed?  Yes   Staff s Initials HC      This procedure involves an intravenous contrast media injection.  IV started in the 22G in right hand. Good blood return.   Flush given for Saline injection; Dose 10 cc     Becka Lea

## 2021-06-11 NOTE — PROGRESS NOTES
I met with Lanre in clinic today at [facility].  He was here for his simulation in preparation of starting radiation treatment.  I explained my role to him and encouraged him to contact me if he has any questions or concerns.  He may need some financial assistance down the road. I will continue to assess that need.

## 2021-06-11 NOTE — PROGRESS NOTES
HPI: This patient is a 57yo M who presents for a post-op visit s/p left parotidectomy, modified radical neck dissection. He was seen a week after his surgery to have his drain removed and was doing well at that time. Later the same day, he developed a hematoma that was drained in the office and a new ARCHIE drain was left in the wound. That drain was removed a week later with no hematoma recurrence. He comes in today for a repeat wound check, but notes that he is having trouble breathing. It has been going on for about a week, and initially felt more like a nuisance and wasn't interfering in his day-to-day; however, now, he is noticing it quite significantly. Still able to be up and active, he has been working as usual. The trouble seems to be a little positional in that he feels better standing up.      Social history, medications, and allergies have been reviewed with the patient and are documented above.     Review of Systems: an ENT specific review of systems is normal     PHYSICAL EXAMINATION:  GEN: no acute distress, normocephalic  FACE: CN VII on the left an HB II/VI but appears to be improving slowly  NECK: no hematoma of the left neck or face. Incisions intact and well healed  PULM: breathing room air without accessory muscle use but does take deep inhales rather often during our encounter     MEDICAL DECISION-MAKING: s/p left parotidectomy/MRND and post-op HPI: This patient is a 57yo M who presents for a post-op visit s/p left parotidectomy, modified radical neck dissection. He was seen last week to have his drain removed and was doing well at that time. Later the same day, he developed a hematoma that was drained in the office and a new ARCHIE drain was left in the wound. He presents today to have the drain removed again and has had no issues or significant drainage in the ARCHIE since last week's initial event.      Social history, medications, and allergies have been reviewed with the patient and are documented  above.     Review of Systems: an ENT specific review of systems is normal     PHYSICAL EXAMINATION:  GEN: no acute distress, normocephalic  FACE: CN VII on the left an HB II/VI but appears to be improving slowly  NECK: no hematoma of the left neck or face. Incisions intact and well healed, the 3cm area of the incision that was re-opened last week is healing appropriately. ARCHIE removed without any issues or bleeding. The drain wound was approximated with Dermabond.  PULM: breathing comfortably on room air, normal chest expansion with respiration     MEDICAL DECISION-MAKING:  left parotidectomy/MRND followed by post-op hematoma doing well from that perspective. However, his shortness of breath is concerning for a possible PE. Have spoken with the ER physician at [facility] who is expecting Lanre in order to do a PE workup and whatever else is deemed appropriate.

## 2021-06-11 NOTE — PROGRESS NOTES
[facility] Radiation Oncology Consult Note    Patient: Lanre Godwin  MRN: [id number]  Date of Service: 06/28/2017    Assessment / Impression     1. Squamous cell carcinoma  NM PET CT Skull to Mid Thigh    gly-carb h-poly a-pot hydrox Soln     No matching staging information was found for the patient.  ECOG Peformance Status  ECOG Performance Status: 0  Distress Assessment Score: 3 (anticipatory)    Plan:   Needs dental w/u   Adjuvant radiation recommended due to extent and upper jose e matting.   XRT to parotid bed and upper neck only  PET for staging.   Return to dermatologist for skin survey.     Face to face time  60 minutes with > 75% spent on consultation, education and coordination of care.    Side effects that may occur during or within weeks after Radiation Therapy      Fatigue and general weakness    Loss of hair on the face and neck    Pain in the irradiated limb    Darkening, irritation, itchiness, redness, dryness,and peeling, scabbing and ulceration of the skin on the neck and face    Mouth and throat dryness, irritation and swallowing difficulties and infection    Thickening of the saliva    Change in or loss of taste sensation    Decrease in appetite    Hoarseness and change in voice    Side effects that may occur months or years after Radiation Therapy      Development of another tumor or cancer    Thickening, telangiectasias (development of spider like blood vessels in the skin) and ulceration of the skin of the face and neck    Fibrosis (scar tissue), decreased flexibility and swelling of the neck    Brain inflammation or necrosis that may cause various neurologic symptoms    Decrease in memory and thinking abilities    Poor healing after a trauma or surgery in the irradiated area    Facial numbness, pain and weakness    Nerve damage resulting in loss of strength and sensation    Tooth and jaw decay and poor healing after dental procedures    The risks, benefits and alternatives to radiation  therapy were outlined with the patient. All questions were answered and a consent was signed.        Subjective:      HPI: Lanre Godwin is a 56 y.o. 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 A superficial parotid node was fused/involved with the skin. Concern for PE had for CT PE run which was negative.     Had squamous cell removed from left forehead 10/2016.      Final Diagnosis   A) SOFT TISSUE FROM LEFT POSTERIOR NECK:         -   METASTATIC SQUAMOUS CELL CARCINOMA, MODERATELY TO POORLY DIFFERENTIATED,               INVOLVING ONE MARKEDLY ENLARGED LYMPH NODE OR ONE MATTED CLUSTER OF               LYMPH NODES, WITH EXTRANODAL TUMOR EXTENSION         -   SKELETAL MUSCLE IS PRESENT; TUMOR DOES NOT DIRECTLY INVADE MUSCLE         -   FOUR ADDITIONAL LYMPH NODES PRESENT, NEGATIVE FOR METASTATIC CARCINOMA     B) LEFT NECK DISSECTION, LEVEL 2-3:         -   ELEVEN LYMPH NODES NEGATIVE FOR METASTATIC CARCINOMA (0/11 LYMPH NODES POSITIVE)         -   NORMAL ADIPOSE AND FIBROUS TISSUES     C) LEFT PAROTID SALIVARY GLAND, TOTAL EXCISION:         -   SQUAMOUS CELL CARCINOMA, MODERATELY TO POORLY DIFFERENTIATED (GRADE 3 OF 4)         -   TUMOR INVADES SURROUNDING FIBROADIPOSE TISSUE BY DIRECT EXTENSION         -   SKELETAL MUSCLE IS PRESENT IN SPECIMEN BUT IS NOT INVADED BY TUMOR         -   TUMOR IS EXTENSIVELY PRESENT IN AT LEAST TWO LEFT POSTERIOR CERVICAL LYMPH NODES,                WITH EXTRANODAL EXTENSION (SEE COMMENT BELOW)         -   EIGHTEEN ADDITIONAL LYMPH NODES (TWO INTRAPAROTID NODES AND SIXTEEN LEVEL 2 AND 3                LEFT NECK NODES) NEGATIVE FOR METASTATIC CARCINOMA (0/18 LYMPH NODES POSITIVE)         -   NO SMALL-VESSEL LYMPHOVASCULAR INVASION IDENTIFIED         -   NO PERINEURAL INVASION IDENTIFIED         -   TUMOR IS STAGE pT3 and pN2 (SEE COMMENT BELOW)           Chief Complaint   Patient presents with     HE Cancer     Consult with Dr. Sheppard   .  Prior Radiation: No  Concurrent Chemotherapy: No    Current Outpatient Prescriptions   Medication Sig Dispense Refill     ibuprofen (ADVIL,MOTRIN) 600 MG tablet Take 600 mg by mouth every 6 (six) hours as needed for pain.       gly-carb h-poly a-pot hydrox Soln 5-10 mL by Mucous Membrane route 4 (four) times a day. 6 Bottle 1     No current facility-administered medications for this visit.      Past Medical History:   Diagnosis Date     Impacted cerumen, left ear      Keratoacanthoma     forehead     Lipoma      Lymph node enlargement      Skin cancer 2016     Past Surgical History:   Procedure Laterality Date     HEAD & NECK SKIN LESION EXCISIONAL BIOPSY      Keratoacanthoma     PAROTIDECTOMY Left 5/25/2017    Procedure: LEFT PAROTIDECTOMY;  Surgeon: Natalee Valenzuela MD;  Location: [facility];  Service:      RADICAL NECK DISSECTION Left 5/25/2017    Procedure: LEFT MODIFIED RADIAL NECK DISSECTION;  Surgeon: Natalee Valenzuela MD;  Location: [facility];  Service:      Review of patient's allergies indicates no known allergies.  Family History   Problem Relation Age of Onset     Prostate cancer Father      Anesthesia problems Neg Hx      Social History     Social History     Marital status: Single     Spouse name: N/A     Number of children: 1     Years of education: N/A     Occupational History           Social History Main Topics     Smoking status: Former Smoker     Packs/day: 1.00     Types: Cigarettes     Start date: 1980     Quit date: 2007     Smokeless tobacco: Never Used     Alcohol use No     Drug use: No     Sexual activity: Not on file     Other Topics Concern     Not on file     Social History Narrative    Does core power yoga five times a week. He has one daughter and is .  Works at [employer] and [employer] as a .             Objective:     Physical  "Exam    Vitals:    06/28/17 0806   BP: 122/75   Pulse: 72   Temp: 98  F (36.7  C)   TempSrc: Oral   SpO2: 95%   Weight: 196 lb 4.8 oz (89 kg)   Height: 5' 10\" (1.778 m)       /75  Pulse 72  Temp 98  F (36.7  C) (Oral)   Ht 5' 10\" (1.778 m)  Wt 196 lb 4.8 oz (89 kg)  SpO2 95%  BMI 28.17 kg/m2  General appearance: alert, appears stated age and cooperative  Head: Normocephalic, without obvious abnormality, atraumatic  Ears: normal TM's and external ear canals both ears  Throat: lips, mucosa, and tongue normal; teeth and gums normal  Neck: no adenopathy and post op expected changes. no evidence infecton   Back: symmetric, no curvature. ROM normal. No CVA tenderness.  Lungs: clear to auscultation bilaterally  Heart: regular rate and rhythm  Extremities: extremities normal, atraumatic, no cyanosis or edema  Skin: left forehead scar wihtout evidence recurrence  Lymph nodes: Cervical, supraclavicular, and axillary nodes normal.  Neurologic: Grossly normal, slight left VII      Review of Systems:        General  General (WDL): All general elements are within defined limits  EENT  ENT (WDL): Exceptions to WDL  Glasses or Contacts: Yes - Chronic (Greater than 3 months) (glasses)  Respiratory       Respiratory (WDL): Exceptions to WDL  Dyspnea: Yes - Recent (Less than 3 months) (post op 2 wks ago and he went to ER)  Cardiovascular  Cardiovascular (WDL): All cardiovascular elements are within defined limits  Endocrine  Endocrine (WDL): All endocrine elements are within defined limits  Gastrointestinal  Gastrointestinal (WDL): All gastrointestinal elements are within defined limits  Musculoskeletal  Musculoskeletal (WDL): All musculoskeletal elements are within defined limits  Integumentary                  Neurological  Neurological (WDL): All neurological elements are within defined limits  Dominant Hand: Right  Psychological/Emotional   Psychological/Emotional (WDL): Exceptions to " WDL  Hematological/Lymphatic  Hematological/Lymphatic (WDL): Exceptions to WDL  Epistaxis: Yes - Chronic (Greater than 3 months) (occasionally)  Dermatologic  Dermatologic (WDL): Exceptions to WDL  Hair Loss: Yes - Recent (Less than 3 months)  Healing Incision: Yes - Recent (Less than 3 months) (left neck)  Genitourinary/Reproductive  Genitourinary/Reproductive (WDL): All genitourinary/reproductive elements are within defined limits  Reproductive     Pain              Currently in Pain: Yes  Pain Score (Initial OR Reassessment): 3  Pain Frequency: Intermittent  Location: incisional area on neck  Pain Intervention(s): Home medication (ibuprofen)  Response to Interventions: good  Accompanied by       Recent Labs: No results found for this or any previous visit (from the past 168 hour(s)).    Imaging: Imaging results 30 days: Cta Chest Pe Run    Result Date: 6/13/2017  CTA CHEST PE RUN 6/13/2017 1:00 PM INDICATION: Short of breath post cancer and neck surgery. TECHNIQUE: Helical acquisition through the chest was performed during the arterial phase of contrast enhancement using IV contrast. 2D and 3D reconstructions were performed by the CT technologist. Dose reduction techniques were used. IV CONTRAST: 80 mL Omni 350. COMPARISON: None. FINDINGS: ANGIOGRAM CHEST: Negative for pulmonary emboli. Negative for thoracic aortic dissection and aneurysms. LUNGS AND PLEURA: Calcified granuloma anterior right upper lobe. No acute infiltrate, pleural effusion, pulmonary edema or pneumothorax. Minimal fibro-atelectatic change in the posteromedial right lower lobe. Calcified right lower lobe pulmonary nodule. MEDIASTINUM: No supraclavicular, mediastinal, hilar, or axillary lymphadenopathy. Small axillary lymph nodes. Calcified subcarinal and precarinal lymph nodes in keeping with previous granulomatous exposure. Calcified right hilar lymph nodes. Course and caliber of great vessels in the mediastinum normal. Left anterior descending  coronary arterial calcification. Heart size normal. No pericardial thickening or effusion. LIMITED UPPER ABDOMEN: Tiny presumed cyst mid zone left kidney. Splenic granulomatous exposure with multiple calcifications. Normal adrenal glands and bowel in the upper abdomen. Atherosclerotic abdominal aorta with focal stenosis at the origin of the celiac axis. MUSCULOSKELETAL: Negative acute. Degenerative change in the thoracolumbar spine. Presumed bilateral gynecomastia.     CONCLUSION: 1.  No acute pulmonary arterial embolism. No evidence for thoracic aortic dissection. 2.  Evidence for previous granulomatous exposure. 3.  No acute pneumonic finding. 4.  Left anterior descending coronary arterial calcification.      Pathology:   Results for orders placed or performed during the hospital encounter of 05/25/17 (from the past 8760 hour(s))   Surgical Pathology Exam   Result Value    Case Report      Surgical Pathology Report                         Case: [id number]                                    Authorizing Provider:  Natalee Valenzuela, Collected:           05/25/2017 1502                                     MD                                                                           Ordering Location:     [facility] OR   Received:            05/25/2017 0876              Pathologist:           Bashir Torres MD                                                       Specimens:   A) - Tissue, Left Posterior Neck                                                                    B) - Tissue, Level 2-3 Left Neck                                                                    C) - Parotid, Left                                                                         Final Diagnosis      A) SOFT TISSUE FROM LEFT POSTERIOR NECK:        -   METASTATIC SQUAMOUS CELL CARCINOMA, MODERATELY TO POORLY DIFFERENTIATED,               INVOLVING ONE MARKEDLY ENLARGED LYMPH NODE OR ONE MATTED CLUSTER OF                LYMPH NODES, WITH EXTRANODAL TUMOR EXTENSION        -   SKELETAL MUSCLE IS PRESENT; TUMOR DOES NOT DIRECTLY INVADE MUSCLE        -   FOUR ADDITIONAL LYMPH NODES PRESENT, NEGATIVE FOR METASTATIC CARCINOMA    B) LEFT NECK DISSECTION, LEVEL 2-3:        -   ELEVEN LYMPH NODES NEGATIVE FOR METASTATIC CARCINOMA (0/11 LYMPH NODES POSITIVE)        -   NORMAL ADIPOSE AND FIBROUS TISSUES    C) LEFT PAROTID SALIVARY GLAND, TOTAL EXCISION:        -   SQUAMOUS CELL CARCINOMA, MODERATELY TO POORLY DIFFERENTIATED (GRADE 3 OF 4)        -   TUMOR INVADES SURROUNDING FIBROADIPOSE TISSUE BY DIRECT EXTENSION        -   SKELETAL MUSCLE IS PRESENT IN SPECIMEN BUT IS NOT INVADED BY TUMOR        -   TUMOR IS EXTENSIVELY PRESENT IN AT LEAST TWO LEFT POSTERIOR CERVICAL LYMPH NODES,                WITH EXTRANODAL EXTENSION (SEE COMMENT BELOW)        -   EIGHTEEN ADDITIONAL LYMPH NODES (TWO INTRAPAROTID NODES AND SIXTEEN LEVEL 2 AND 3               LEFT NECK NODES) NEGATIVE FOR METASTATIC CARCINOMA (0/18 LYMPH NODES POSITIVE)        -   NO SMALL-VESSEL LYMPHOVASCULAR INVASION IDENTIFIED        -   NO PERINEURAL INVASION IDENTIFIED        -   TUMOR IS STAGE pT3 and pN2 (SEE COMMENT BELOW)    MCSS    Comment      Two lymph nodes, or lymph node clusters, are largely replaced by tumor, but these nodes are almost directly adjacent to the parotid gland, and it is not clear whether the tumor has invaded these nodes by direct extension, or has metastasized to them. Since at least two lymph nodes are involved, and the largest area of tumor metastasis or extension is less than 6 cm in diameter, the tumor is staged pN2.    Microscopic Description      Microscopic examination performed, substantiating the above diagnosis.    Clinical Information      Pre-op Diagnosis:  Neck malignant neoplasm [C76.0]  Time in Formalin:  A) 3:06 pm; B&C) Not provided    Gross Description      A) Received in a formalin-filled container, labeled with the patient's name and  "designated \"left posterior neck,\" is an irregular fragment of gray-brown soft tissue measuring 6.8 x 5.0 x 4.3 cm. Multiple grayish-brown lymph nodes are identified and removed by a combination of blunt dissection and sharp dissection. After removing the obvious lymph nodes, the specimen is serially sectioned to reveal a partially necrotic and hemorrhagic, fairly well-circumscribed, tan-white tumor mass measuring 3.0 cm in greatest dimension. This tumor mass has areas of cystic/hemorrhagic/necrotic degeneration. Sections of the tumor mass are submitted. YRIS NAVARRO    CASSETTE KEY: A1) Three possible lymph nodes; A2) One lymph node bisected; A3) One lymph node bisected; A4-7) Dominant tumor mass.      B) Received in a formalin-filled container, labeled with the patient's name and designated \"tissue level 2-3 left neck,\" is a single fragment of fibrofatty tissue measuring 6.3 x 3.5 x 1.4 cm. Serial sectioning  reveals multiple apparent lymph nodes measuring from 0.2 to 0.9 cm in diameter. All nodes are submitted. YRIS NAVARRO    CASSETTE KEY: B1-2) One lymph node bisected; B3) Four possible lymph nodes; B4) Two to four possible lymph nodes; B5) One possible lymph node; B6) Two possible lymph nodes; B7) One possible lymph node.      C) Received in a formalin-filled container, labeled with the patient's name and designated \"left parotid,\" is a grayish-brown parotidectomy specimen, 9.3 x 6.0 x 4.5 cm. The specimen is inked black and serially sectioned. Sections demonstrate a partially necrotic/cystic/hemorrhagic, tan-white tissue mass measuring 2.5 cm in greatest dimension. Sections of the tumor mass with the closest black-inked margins are submitted into cassette C1-C6. Possible overlying skin is submitted into cassette C7. A firm area of tan-white nodularity, separate from the primary tumor, is submitted into cassette C8. Sections of uninvolved tissue are submitted in cassette C9. TANA NAVARRO   KDP:db    Charges CPT: 88309 X2, " 18192  ICD-10: C08.9    Result Flag      Comment:   SPECIMEN PROCESSING:    All histology slide preparation and stains; and cytology slide preparation, staining, and cytotechnologist screening done at [facility] are performed at [facility], [address] [place] [address], [id number], with final interpretation, frozen section analysis, and cytology adequacy assessment at indicated laboratory.             Signed by: Hallie Sheppard MD

## 2021-06-11 NOTE — PROGRESS NOTES
Patient here ambulatory for radiation consult for cancer in his parotid.  Patient states that they are unsure if this is primary or if it is coming from a prior skin cancer he had on his forehead.  Patient incision is healing well.  States he has occasional tightness & swelling at site that is relieved with ibuprofen.  Patient has been back to work without problems.  20 minutes spent in review of radiation process and potential side effects.  Written information given: ACS RT book, Dolly RT handouts, RT to H&N handout, Nutrition during cancer treatment book, team photo sheet, oral care and rinse handouts, HE class schedule, doctor bio card, welcome letter.  Seen by Dr. Sheppard.  Plan RTC for follow up and/or CT simulation as directed by physician.      Called patient after he had left the clinic to explain the use of [employer] and the process involved with that.  Patient will bring his drivers license and tax forms that are needed for reimbursement process to his simulation.  Patient verbalized understanding and has call back number for any further questions or concerns.

## 2021-06-11 NOTE — PROGRESS NOTES
HPI: This patient is a 55yo M who presents for a post-op visit s/p left parotidectomy, modified radical neck dissection. He was seen last week to have his drain removed and was doing well at that time. Later the same day, he developed a hematoma that was drained in the office and a new ARCHIE drain was left in the wound. He presents today to have the drain removed again and has had no issues or significant drainage in the ARCHIE since last week's initial event.     Social history, medications, and allergies have been reviewed with the patient and are documented above.    Review of Systems: an ENT specific review of systems is normal    PHYSICAL EXAMINATION:  GEN: no acute distress, normocephalic  FACE: CN VII on the left an HB II/VI but appears to be improving slowly  NECK: no hematoma of the left neck or face. Incisions intact and well healed, the 3cm area of the incision that was re-opened last week is healing appropriately. ARCHIE removed without any issues or bleeding. The drain wound was approximated with Dermabond.  PULM: breathing comfortably on room air, normal chest expansion with respiration    MEDICAL DECISION-MAKING: doing well s/p hematoma s/p left parotidectomy/MRND. Doing well. [facility] for post-surgical XRT to start around 6 weeks after surgery ideally.

## 2021-06-12 NOTE — PROGRESS NOTES
RADIATION ONCOLOGY WEEKLY TREATMENT VISIT NOTE      Assessment / Impression       1. Squamous cell carcinoma       No matching staging information was found for the patient.   Tolerating radiation therapy well.  All questions and concerns addressed.      Plan:     Continue radiation treatment as prescribed.  Radiation: Site: Left Parotid  Stereotactic Radiosurgery: No  Concurrent Therapy: No  Today's Dose: 4600  Total Dose for Head and Neck: 6000  Today's Fraction/Total Fraction Head and Neck: 23/30        Subjective:      HPI: Lanre Godwin is a 56 y.o. male with    1. Squamous cell carcinoma         The following portions of the patient's history were reviewed and updated as appropriate: allergies, current medications, past family history, past medical history, past social history, past surgical history and problem list.      Objective:     Exam:     Vitals:    08/23/17 0940   BP: 118/71   Pulse: 69   Temp: 97.8  F (36.6  C)   TempSrc: Oral   SpO2: 98%   Weight: 195 lb 12.8 oz (88.8 kg)       Wt Readings from Last 8 Encounters:   08/23/17 195 lb 12.8 oz (88.8 kg)   08/16/17 197 lb 3.2 oz (89.4 kg)   08/09/17 199 lb (90.3 kg)   08/02/17 199 lb 1.6 oz (90.3 kg)   07/26/17 200 lb 8 oz (90.9 kg)   06/28/17 196 lb 4.8 oz (89 kg)   06/13/17 195 lb (88.5 kg)   06/13/17 201 lb (91.2 kg)       General: Alert and oriented, in no acute distress  Lanre has moderate Erythema.    Treatment Summary to Date    Aria chart and setup information reviewed    Hallie Sheppard MD

## 2021-06-12 NOTE — PROGRESS NOTES
I met with Lanre today in clinic.  He was here for his daily radiation.  He is still tolerating his treatment pretty well.  He does have some minor sores, but not too bad.  He continues to work full time.  I encouraged him to ask the doctor or nurse if he needs anything for the mouth sores, if they worsen.  No need for any resources.  He appreciated the check in.

## 2021-06-12 NOTE — PROGRESS NOTES
I met with Lanre in radiation at [facility] today.  He is doing ok.  Had a good weekend.  States he is trying to take it easy.  No complaints or needs identified.  I told him I will check in again next week but to call sooner if he needs anything.

## 2021-06-12 NOTE — PROGRESS NOTES
RADIATION ONCOLOGY WEEKLY TREATMENT VISIT NOTE      Assessment / Impression       1. Squamous cell carcinoma       No matching staging information was found for the patient.   Tolerating radiation therapy well.  All questions and concerns addressed.  Membranous mucositis left oropharyns.,tender left upper neck,no LN palpable    Plan:     Continue radiation treatment as prescribed.  Radiation: Site: Left Parotid  Stereotactic Radiosurgery: No  Concurrent Therapy: No  Today's Dose: 2600  Total Dose for Head and Neck: 6000  Today's Fraction/Total Fraction Head and Neck: 13/30    Plenty of fluids & MUgard as needed.    Subjective:      HPI: Lanre Godwin is a 56 y.o. male with    1. Squamous cell carcinoma         The following portions of the patient's history were reviewed and updated as appropriate: allergies, current medications, past family history, past medical history, past social history, past surgical history and problem list.      Objective:     Exam:     Vitals:    08/09/17 0813   BP: 122/79   Pulse: 74   SpO2: 98%   Weight: 199 lb (90.3 kg)       Wt Readings from Last 8 Encounters:   08/09/17 199 lb (90.3 kg)   08/02/17 199 lb 1.6 oz (90.3 kg)   07/26/17 200 lb 8 oz (90.9 kg)   06/28/17 196 lb 4.8 oz (89 kg)   06/13/17 195 lb (88.5 kg)   06/13/17 201 lb (91.2 kg)   06/06/17 201 lb (91.2 kg)   05/30/17 201 lb 8 oz (91.4 kg)       General: Alert and oriented, in no acute distress  Lanre has mild Erythema.    Treatment Summary to Date    Aria chart and setup information reviewed    Bee Trujillo MD

## 2021-06-12 NOTE — PROGRESS NOTES
"  RADIATION ONCOLOGY WEEKLY TREATMENT VISIT NOTE      Assessment / Impression       1. Squamous cell carcinoma       No matching staging information was found for the patient.   Tolerating radiation therapy well.  All questions and concerns addressed.      Plan:   Not wanting to use MUgard as directed.   Education about mouth care given     Continue radiation treatment as prescribed.  Radiation: Site: Left Parotid  Stereotactic Radiosurgery: No  Concurrent Therapy: No  Today's Dose: 1600  Total Dose for Head and Neck: 6000  Today's Fraction/Total Fraction Head and Neck: 8/30        Subjective:      HPI: Lanre Godwin is a 56 y.o. male with    1. Squamous cell carcinoma         The following portions of the patient's history were reviewed and updated as appropriate: allergies, current medications, past family history, past medical history, past social history, past surgical history and problem list.      Objective:     Exam:     Vitals:    08/02/17 0932   BP: 128/79   Pulse: 67   Temp: 97.7  F (36.5  C)   TempSrc: Oral   SpO2: 99%   Weight: 199 lb 1.6 oz (90.3 kg)   Height: 5' 10\" (1.778 m)       Wt Readings from Last 8 Encounters:   08/02/17 199 lb 1.6 oz (90.3 kg)   07/26/17 200 lb 8 oz (90.9 kg)   06/28/17 196 lb 4.8 oz (89 kg)   06/13/17 195 lb (88.5 kg)   06/13/17 201 lb (91.2 kg)   06/06/17 201 lb (91.2 kg)   05/30/17 201 lb 8 oz (91.4 kg)   05/25/17 194 lb 12.8 oz (88.4 kg)       General: Alert and oriented, in no acute distress  Lanre has grade I  mucositis.    Treatment Summary to Date    Aria chart and setup information reviewed    Hlalie Sheppard MD  "

## 2021-06-12 NOTE — PROGRESS NOTES
I met with Agustín today in radiation therapy at [facility].  Agustín is doing pretty well.  States he has some small mouth sores, but manageable so far.  I encouraged him to let the nurses know if they get worse as there are other medicines he can add along with the mugaard.  Otherwise doing well.  No needs identified today.  I encouraged him to call and will try and check in with him next week as well.

## 2021-06-12 NOTE — PROGRESS NOTES
"  RADIATION ONCOLOGY WEEKLY TREATMENT VISIT NOTE      Assessment / Impression       1. Squamous cell carcinoma       No matching staging information was found for the patient.   Tolerating radiation therapy well.  All questions and concerns addressed.      Plan:     Continue radiation treatment as prescribed.  Radiation: Site: Left Parotid  Stereotactic Radiosurgery: No  Concurrent Therapy: No  Today's Dose: 600  Total Dose for Head and Neck: 6000  Today's Fraction/Total Fraction Head and Neck: 3/30        Subjective:      HPI: Lanre Godwin is a 56 y.o. male with    1. Squamous cell carcinoma         The following portions of the patient's history were reviewed and updated as appropriate: allergies, current medications, past family history, past medical history, past social history, past surgical history and problem list.      Objective:     Exam:     Vitals:    07/26/17 0930   BP: 125/74   Pulse: 71   Temp: 97.7  F (36.5  C)   TempSrc: Oral   SpO2: 97%   Weight: 200 lb 8 oz (90.9 kg)   Height: 5' 10\" (1.778 m)       Wt Readings from Last 8 Encounters:   07/26/17 200 lb 8 oz (90.9 kg)   06/28/17 196 lb 4.8 oz (89 kg)   06/13/17 195 lb (88.5 kg)   06/13/17 201 lb (91.2 kg)   06/06/17 201 lb (91.2 kg)   05/30/17 201 lb 8 oz (91.4 kg)   05/25/17 194 lb 12.8 oz (88.4 kg)   05/24/17 201 lb 8 oz (91.4 kg)       General: Alert and oriented, in no acute distress  Lanre has no Erythema.    Treatment Summary to Date    Aria chart and setup information reviewed    Hallie Sheppard MD  "

## 2021-06-12 NOTE — PROGRESS NOTES
RADIATION ONCOLOGY WEEKLY TREATMENT VISIT NOTE      Assessment / Impression       1. Squamous cell carcinoma         Tolerating radiation therapy well.  All questions and concerns addressed.      Plan:   COntinue mugard  F/u 6 week prn    Continue radiation treatment as prescribed.  Radiation: Site: Left Parotid  Stereotactic Radiosurgery: No  Concurrent Therapy: No  Today's Dose: 5600  Total Dose for Head and Neck: 6000  Today's Fraction/Total Fraction Head and Neck: 28/30        Subjective:      HPI: Lanre Godwin is a 56 y.o. male with    1. Squamous cell carcinoma         The following portions of the patient's history were reviewed and updated as appropriate: allergies, current medications, past family history, past medical history, past social history, past surgical history and problem list.      Objective:     Exam:     Vitals:    08/30/17 0942   BP: 123/72   Pulse: 66   Temp: 97.8  F (36.6  C)   TempSrc: Oral   SpO2: 98%   Weight: 194 lb 6.4 oz (88.2 kg)       Wt Readings from Last 8 Encounters:   08/30/17 194 lb 6.4 oz (88.2 kg)   08/23/17 195 lb 12.8 oz (88.8 kg)   08/16/17 197 lb 3.2 oz (89.4 kg)   08/09/17 199 lb (90.3 kg)   08/02/17 199 lb 1.6 oz (90.3 kg)   07/26/17 200 lb 8 oz (90.9 kg)   06/28/17 196 lb 4.8 oz (89 kg)   06/13/17 195 lb (88.5 kg)       General: Alert and oriented, in no acute distress  Lanre has grade 1 mucositis.     Treatment Summary to Date    Aria chart and setup information reviewed    Hallie Sheppard MD

## 2021-06-12 NOTE — PROGRESS NOTES
Patient here ambulatory for final radiation treatment.  Dry desquamation to left parotid area.  Reviewed skin cares and continued use of MuGard.  Written discharge instructions reviewed and given to patient.  Follow up with Dr. Sheppard in about 4 to 6 weeks.  Patient verbalized understanding and left ambulatory with printed copy of appointments.

## 2021-06-12 NOTE — PROGRESS NOTES
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      SITE TREATED:Left  parotid bed upper neck   TOTAL DOSE: 6000cGy   NUMBER OF FRACTIONS: 30   DATES COMPLETED: 2017  CONCURRENT CHEMOTHERAPY: No  ADJUVANT THERAPY:No    Lanre tolerated the treatment without unexpected side effects.     PLAN: Discharge instructions were given and Lanre knows to call if questions/issues arise. Lanre will be seen in f/u in 6 weeks without a scan. Long term care to be done by ENT.  Scans to be ordered by ENT.       Signed by: Hallie Sheppard MD

## 2021-06-12 NOTE — PROGRESS NOTES
"  RADIATION ONCOLOGY WEEKLY TREATMENT VISIT NOTE      Assessment / Impression       1. Squamous cell carcinoma         Tolerating radiation therapy well.  All questions and concerns addressed.      Plan:   Continue Mugard.     Continue radiation treatment as prescribed.  Radiation: Site: Left Parotid  Stereotactic Radiosurgery: No  Concurrent Therapy: No  Today's Dose: 3600  Total Dose for Head and Neck: 6000  Today's Fraction/Total Fraction Head and Neck: 18/30        Subjective:      HPI: Lanre Godwin is a 56 y.o. male with    1. Squamous cell carcinoma         The following portions of the patient's history were reviewed and updated as appropriate: allergies, current medications, past family history, past medical history, past social history, past surgical history and problem list.      Objective:     Exam:     Vitals:    08/16/17 0942   BP: 121/70   Pulse: 65   Temp: 97.8  F (36.6  C)   TempSrc: Oral   SpO2: 99%   Weight: 197 lb 3.2 oz (89.4 kg)   Height: 5' 10\" (1.778 m)       Wt Readings from Last 8 Encounters:   08/16/17 197 lb 3.2 oz (89.4 kg)   08/09/17 199 lb (90.3 kg)   08/02/17 199 lb 1.6 oz (90.3 kg)   07/26/17 200 lb 8 oz (90.9 kg)   06/28/17 196 lb 4.8 oz (89 kg)   06/13/17 195 lb (88.5 kg)   06/13/17 201 lb (91.2 kg)   06/06/17 201 lb (91.2 kg)       General: Alert and oriented, in no acute distress  Lanre has mild Erythema.    Treatment Summary to Date    Aria chart and setup information reviewed    Hallie Sheppard MD  "

## 2021-06-13 NOTE — PROGRESS NOTES
DIAGNOSIS: [id number] poorly differentiated SCC of the left parotid gland  TREATMENT: left parotidectomy and MRND 5/25/17, post-op XRT that finished 9/1/17    HPI: Lanre returns to re-establish routine surveillance for the above diagnosis. He tolerated radiation well overall and is reporting no major issues. Eating well, maintaining his weight. No new masses or lesions in his left neck. He does have some mild lymphedema as expected in the left submandibular neck.     Social history, medications, and allergies have been reviewed with the patient and are documented above.      Review of Systems: an ENT specific review of systems is normal      PHYSICAL EXAMINATION:  GEN: no acute distress, normocephalic  FACE: CN VII on the left an HB II/VI but appears to be improving slowly  NECK: no hematoma of the left neck or face. Incisions intact and well healed  PULM: breathing room air without accessory muscle use but does take deep inhales rather often during our encounter      MEDICAL DECISION-MAKING: s/p left parotidectomy/MRND and post-op HPI: This patient is a 55yo M who presents for a post-op visit s/p left parotidectomy, modified radical neck dissection. He was seen last week to have his drain removed and was doing well at that time. Later the same day, he developed a hematoma that was drained in the office and a new ARCHIE drain was left in the wound. He presents today to have the drain removed again and has had no issues or significant drainage in the ARCHIE since last week's initial event.       Past medical history, past surgical history, social history, medications, and allergies have been reviewed with the patient and are documented above.      Review of Systems: 10-system review is negative except that listed in the HPI. Refer to scanned document of same date for ROS details.      PHYSICAL EXAMINATION:  GEN: no acute distress, normocephalic  FACE: CN VII symmetric and strong bilaterally  EARS: normal auricles, EACs patent  bilaterally with scan cerumen. TMs intact and clear bilaterally  OC/OP: clear, dentition in good repair. Tongue fully mobile. FOM and mucosa clear.  NECK: No masses or lesions noted. Incisions are well healed. Infraauricular depression as expected following total parotid and MRND.   PULM: breathing comfortably on room air, normal chest expansion with respiration      MEDICAL DECISION-MAKING: clinically RENY from the above disease 1 month after finishing post-operative xrt. Will have him follow-up in about 6 weeks and will order his post-treatment PET at that time.  Discussed massage for mild lymphedema.

## 2021-06-13 NOTE — PROGRESS NOTES
[facility] Radiation Oncology Follow Up Note    Patient: Lanre Godwin  MRN: [id number]  Date of Service: 09/28/2017    Assessment / Impression     1. Squamous cell carcinoma        No matching staging information was found for the patient.  ECOG Peformance Status  ECOG Performance Status: 0  Distress Assessment Score  Distress Assessment Score: No distress  Body site: Head & Neck    Plan:   F/u prn  Long term care/scans by ENT. Nystatin if mouth gets lynette sore. Patient doesn't want nystatin now. 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       SITE TREATED:Left  parotid bed upper neck   TOTAL DOSE: 6000cGy   NUMBER OF FRACTIONS: 30   DATES COMPLETED: 9/1/2017  CONCURRENT CHEMOTHERAPY: No  ADJUVANT THERAPY:No     Lanre tolerated the treatment without unexpected side effects. Taste improving but still off some.        Chief Complaint   Patient presents with     HE Cancer     Follow up with Dr. Sheppard   .    Current Outpatient Prescriptions   Medication Sig Dispense Refill     ibuprofen (ADVIL,MOTRIN) 600 MG tablet Take 600 mg by mouth every 6 (six) hours as needed for pain.       No current facility-administered medications for this visit.        The following portions of the patient's history were reviewed and updated as appropriate: allergies, current medications, past family history, past medical history, past social history, past surgical history and problem list.    Review of Systems    Constitutional  Constitutional (WDL): All constitutional elements are within defined limits  Neurosensory  Neurosensory (WDL): All neurosensory elements are within defined limits  Eye        Eye Disorder (WDL): All eye disorder elements are within defined limits (wears glasses)  Ear    "  Cardiovascular  Cardiovascular (WDL): All cardiovascular elements are within defined limits  Pulmonary  Respiratory (WDL): Within Defined Limits  Gastrointestinal  Gastrointestinal (WDL): Exceptions to WDL  Dysgeusia: Altered taste but no change in diet  Dry Mouth: Symptomatic (e.g., dry or thick saliva) without significant dietary alteration, unstimulated saliva flow >0.2 ml/min  Genitourinary  Genitourinary (WDL): All genitourinary elements are within defined limits              Musculoskeletal              Musculoskeletal and Connetive Tissue Disorders (WDL): All Musculoskeletal and Connetive Tissue Disorder elements are within defined limits  Integumentary  Integumentary (WDL): Exceptions to WDL (some skin discoloration in tx area at left parotid)  Patient Coping  Patient Coping: Accepting  Pain              Currently in Pain: No/denies  Accompanied by  Accompanied by: Alone    Objective:     Physical Exam    Vitals:    09/28/17 0952   BP: 120/74   Pulse: (!) 57   Temp: 97.7  F (36.5  C)   TempSrc: Oral   SpO2: 97%   Weight: 191 lb 11.2 oz (87 kg)   Height: 5' 10\" (1.778 m)        Mucositis improved, still some erythema. Perhaps some early thrush     Recent Labs: No results found for this or any previous visit (from the past 168 hour(s)).        Signed by: Hallie Sheppard MD  "

## 2021-06-13 NOTE — PROGRESS NOTES
Patient here ambulatory for follow up s/p radiation for left parotid.  Patient states he is feeling good and back to normal activities.  States his taste is improving but not back to normal yet.  Denies sores in mouth or swallowing problems.  Skin appears to be healing with some erythema present.  States he has a fullness in his left ear occasionally.  Seen by Dr. Sheppard.  Plan RTC for follow up as directed by physician.

## 2021-06-14 NOTE — PROGRESS NOTES
DIAGNOSIS: Left parotid [id number] SCC  TREATMENT: left parotidectomy and neck dissection 5/17, post-op XRT that finished 9/1/17    HPI: Lanre returns today for routine surveillance of the above diagnosis. He is doing well overall and reports no recurrent masses, lesions, fevers, weight loss, odynophagia, dysphagia, or facial weakness.    Past medical history, past surgical history, social history, medications, and allergies have been reviewed with the patient and are documented above.    Review of Systems: a 10-system review was performed. Pertinent positives are noted in the HPI and on a separate scanned document in the chart.    PHYSICAL EXAMINATION:  GEN: no acute distress, normocephalic  EYES: extraocular movements are intact, pupils are equal and round. Sclera clear.   EARS: auricles are normally formed. The external auditory canals are clear with minimal to no cerumen. Tympanic membranes are intact bilaterally with no signs of infection, effusion, retractions, or perforations.  NOSE: anterior nares are patent. There are no masses or lesions. The septum is non-obstructing.  OC/OP: clear, dentition is in good repair. The tongue and palate are fully mobile and symmetric.   NECK: s/p left parotidectomy and neck dissection. No new masses or lesions. Moderate woodiness as expected. Very mild level I lymphedema  NEURO: CN II-XII are intact bilaterally. alert and oriented. Gait is normal.  PULM: breathing comfortably on room air, normal chest expansion with respiration    MEDICAL DECISION-MAKING: Lanre is a 56yo F with the above diagnosis and treatment who is clinically RENY today. He is 3 months out from XRT and will need a post-treatment PET. This was ordered and I will contact him with the results. RTC 1 month.

## 2021-06-15 PROBLEM — C44.92 SQUAMOUS CELL CARCINOMA OF SKIN, UNSPECIFIED: Status: ACTIVE | Noted: 2017-05-26

## 2021-06-15 PROBLEM — R59.9 LYMPH NODE ENLARGEMENT: Status: ACTIVE | Noted: 2017-03-10

## 2021-06-15 PROBLEM — D17.9 LIPOMA: Status: ACTIVE | Noted: 2017-05-24

## 2021-06-15 PROBLEM — H61.22 IMPACTED CERUMEN OF LEFT EAR: Status: ACTIVE | Noted: 2017-03-10

## 2021-06-15 PROBLEM — Z12.5 PROSTATE CANCER SCREENING: Status: ACTIVE | Noted: 2017-03-10

## 2021-06-15 NOTE — PROGRESS NOTES
DIAGNOSIS: Left parotid [id number] SCC  TREATMENT: left parotidectomy and neck dissection 5/17, post-op XRT that finished 9/1/17     HPI: Lanre returns today for routine surveillance of the above diagnosis. He is doing well overall and reports no recurrent masses, lesions, fevers, weight loss, odynophagia, dysphagia, or facial weakness.     Past medical history, past surgical history, social history, medications, and allergies have been reviewed with the patient and are documented above.     Review of Systems: a 10-system review was performed. Pertinent positives are noted in the HPI and on a separate scanned document in the chart.     PHYSICAL EXAMINATION:  GEN: no acute distress, normocephalic  EYES: extraocular movements are intact, pupils are equal and round. Sclera clear.   EARS: auricles are normally formed. The external auditory canals are clear with minimal to no cerumen. Tympanic membranes are intact bilaterally with no signs of infection, effusion, retractions, or perforations.  OC/OP: clear, dentition is in good repair. The tongue and palate are fully mobile and symmetric. Mildly dry mucosa  NECK: s/p left parotidectomy and neck dissection. No new masses or lesions. Moderate woodiness as expected.   NEURO: CN II-XII are intact bilaterally. alert and oriented. Gait is normal.  PULM: breathing comfortably on room air, normal chest expansion with respiration     PET SCAN 12/17:  CONCLUSION:  No evidence of disease    MEDICAL DECISION-MAKING: Lanre is a 58yo F with the above diagnosis and treatment who is clinically and radiographically RENY today. RTC 1 month.

## 2021-06-16 PROBLEM — C77.9 METASTATIC SQUAMOUS CELL CARCINOMA TO LYMPH NODE (H): Status: ACTIVE | Noted: 2020-01-27

## 2021-06-16 NOTE — PROGRESS NOTES
DIAGNOSIS: Left parotid [id number] SCC  TREATMENT: left parotidectomy and neck dissection 5/17, post-op XRT that finished 9/1/17      HPI: Lanre returns today for routine surveillance of the above diagnosis. He is doing well overall and reports no recurrent masses, lesions, fevers, weight loss, odynophagia, dysphagia, or facial weakness. PET scan from 12/17 showed RENY. He notices that his jowl on the left seems a little more than the jowl on the right.      Past medical history, past surgical history, social history, medications, and allergies have been reviewed with the patient and are documented above.      Review of Systems: a 10-system review was performed. Pertinent positives are noted in the HPI and on a separate scanned document in the chart.      PHYSICAL EXAMINATION:  GEN: no acute distress, normocephalic  EYES: extraocular movements are intact, pupils are equal and round. Sclera clear.   EARS: auricles are normally formed. The external auditory canals are clear with minimal to no cerumen. Tympanic membranes are intact bilaterally with no signs of infection, effusion, retractions, or perforations.  OC/OP: clear, dentition is in good repair. The tongue and palate are fully mobile and symmetric. Mildly dry mucosa  NECK: s/p left parotidectomy and neck dissection. No new masses or lesions. Moderate woodiness as expected.   NEURO: CN II-XII are grossly intact bilaterally. alert and oriented. Gait is normal.  PULM: breathing comfortably on room air, normal chest expansion with respiration      PET SCAN 12/17:  CONCLUSION:  No evidence of disease     MEDICAL DECISION-MAKING: Lanre is a 58yo F with the above diagnosis and treatment who is clinically RENY today. The slight jowl asymmetry is secondary to very mild lymphedema following surgery/xrt to the neck immediately inferior to this area. Reassured him and explained the pathophysiology of this. RTC 1 month.

## 2021-06-17 NOTE — PROGRESS NOTES
DIAGNOSIS: Left parotid [id number] SCC  TREATMENT: left parotidectomy and neck dissection 5/17, post-op XRT that finished 9/1/17      HPI: Lanre returns today for routine surveillance of the above diagnosis. He is doing well overall and reports no recurrent masses, lesions, fevers, weight loss, odynophagia, dysphagia, or facial weakness. PET scan from 12/17 showed RENY. Continues to stretch his neck as a routine, which has kept his tissues pretty loose and mobile. Some dry mouth as expected.      Past medical history, past surgical history, social history, medications, and allergies have been reviewed with the patient and are documented above.      Review of Systems: a 10-system review was performed. Pertinent positives are noted in the HPI and on a separate scanned document in the chart.      PHYSICAL EXAMINATION:  GEN: no acute distress, normocephalic  EYES: extraocular movements are intact, pupils are equal and round. Sclera clear.   EARS: auricles are normally formed. The external auditory canals are clear with minimal to no cerumen. Tympanic membranes are intact bilaterally with no signs of infection, effusion, retractions, or perforations.  OC/OP: clear, dentition is in good repair. The tongue and palate are fully mobile and symmetric. Mildly dry mucosa  NECK: s/p left parotidectomy and neck dissection. No new masses or lesions. Moderate woodiness as expected.   NEURO: CN II-XII are grossly intact bilaterally. alert and oriented. Gait is normal.  PULM: breathing comfortably on room air, normal chest expansion with respiration      PET SCAN 12/17:  CONCLUSION:  No evidence of disease      MEDICAL DECISION-MAKING: Lanre is a 58yo F with the above diagnosis and treatment who is clinically RENY today. RTC 1 month.

## 2021-06-18 NOTE — PROGRESS NOTES
DIAGNOSIS: Left parotid [id number] SCC  TREATMENT: left parotidectomy and neck dissection 5/17, post-op XRT that finished 9/1/17      HPI: Lanre is a 56yo M who returns today for routine surveillance of the above diagnosis. He is doing well overall and reports no recurrent masses, lesions, fevers, weight loss, odynophagia, dysphagia, or facial weakness. PET scan from 12/17 showed RENY. Continues to stretch his neck as a routine, which has kept his tissues pretty loose and mobile. Some dry mouth as expected.      Past medical history, past surgical history, social history, medications, and allergies have been reviewed with the patient and are documented above.      Review of Systems: a 10-system review was performed. Pertinent positives are noted in the HPI and on a separate scanned document in the chart.      PHYSICAL EXAMINATION:  GEN: no acute distress, normocephalic  EYES: extraocular movements are intact, pupils are equal and round. Sclera clear.   NECK: s/p left parotidectomy and neck dissection. No new masses or lesions. Moderate woodiness as expected.   NEURO: CN II-XII are grossly intact bilaterally. alert and oriented. Gait is normal.  PULM: breathing comfortably on room air, normal chest expansion with respiration      PET SCAN 12/17:  CONCLUSION:  No evidence of disease      MEDICAL DECISION-MAKING: Lanre is a 56yo F with the above diagnosis and treatment who is clinically RENY today. RTC 1 month.

## 2021-06-19 NOTE — PROGRESS NOTES
DIAGNOSIS: Left parotid [id number] SCC  TREATMENT: left parotidectomy and neck dissection 5/17, post-op XRT that finished 9/1/17      HPI: Lanre is a 56yo M who returns today for routine surveillance of the above diagnosis. He is doing well overall and reports no recurrent masses, lesions, fevers, weight loss, odynophagia, dysphagia, or facial weakness. PET scan from 12/17 showed RENY. Continues to stretch his neck as a routine, which has kept his tissues pretty loose and mobile. Some dry mouth as expected.      Past medical history, past surgical history, social history, medications, and allergies have been reviewed with the patient and are documented above.      Review of Systems: a 10-system review was performed. Pertinent positives are noted in the HPI and on a separate scanned document in the chart.      PHYSICAL EXAMINATION:  GEN: no acute distress, normocephalic  EYES: extraocular movements are intact, pupils are equal and round. Sclera clear.   NECK: s/p left parotidectomy and neck dissection. No new masses or lesions. Moderate woodiness as expected.   NEURO: CN II-XII are grossly intact bilaterally. alert and oriented. Gait is normal.  PULM: breathing comfortably on room air, normal chest expansion with respiration      PET SCAN 12/17:  CONCLUSION:  No evidence of disease      MEDICAL DECISION-MAKING: Lanre is a 56yo F with the above diagnosis and treatment who is clinically RENY today. RTC 1 month. Will order his 1yr post-treatment scan at his next visit.

## 2021-06-19 NOTE — PROGRESS NOTES
DIAGNOSIS: Left parotid [id number] SCC  TREATMENT: left parotidectomy and neck dissection 5/17, post-op XRT that finished 9/1/17      HPI: Lanre is a 56yo M who returns today for routine surveillance of the above diagnosis. He is doing well overall and reports no recurrent masses, lesions, fevers, weight loss, odynophagia, dysphagia, or facial weakness. PET scan from 12/17 showed RENY.       Past medical history, past surgical history, social history, medications, and allergies have been reviewed with the patient and are documented above.      Review of Systems: a 10-system review was performed. Pertinent positives are noted in the HPI and on a separate scanned document in the chart.      PHYSICAL EXAMINATION:  GEN: no acute distress, normocephalic  EYES: extraocular movements are intact, pupils are equal and round. Sclera clear.   NECK: s/p left parotidectomy and neck dissection. No new masses or lesions. Moderate woodiness as expected.   NEURO: CN II-XII are grossly intact bilaterally. alert and oriented. Gait is normal.  PULM: breathing comfortably on room air, normal chest expansion with respiration      PET SCAN 12/17:  CONCLUSION:  No evidence of disease      MEDICAL DECISION-MAKING: Lanre is a 56yo F with the above diagnosis and treatment who is clinically RENY today. RTC 1 month. Will order 1yr post-treatment PET.

## 2021-06-20 NOTE — PROGRESS NOTES
DIAGNOSIS: Left parotid [id number] SCC  TREATMENT: left parotidectomy and neck dissection 5/17, post-op XRT that finished 9/1/17      HPI: Lanre is a 56yo M who returns today for routine surveillance of the above diagnosis. He is doing well overall and reports no recurrent masses, lesions, fevers, weight loss, odynophagia, dysphagia, or facial weakness. PET scan from 8/18 is clear.       Past medical history, past surgical history, social history, medications, and allergies have been reviewed with the patient and are documented above.      Review of Systems: a 10-system review was performed. Pertinent positives are noted in the HPI and on a separate scanned document in the chart.      PHYSICAL EXAMINATION:  GEN: no acute distress, normocephalic  EYES: extraocular movements are intact, pupils are equal and round. Sclera clear.   NECK: s/p left parotidectomy and neck dissection. No new masses or lesions. Moderate woodiness as expected.   NEURO: CN II-XII are grossly intact bilaterally. alert and oriented. Gait is normal.  PULM: breathing comfortably on room air, normal chest expansion with respiration      PET SCAN 12/17:  CONCLUSION:  No evidence of disease    PET SCAN 8/18:  CONCLUSION:  No evidence of recurrent or metastatic squamous cell carcinoma.      MEDICAL DECISION-MAKING: Lanre is a 56yo F with the above diagnosis and treatment who is clinically and radiographically RENY today, now 1 full year out from his treatment. RTC every other month this year.

## 2021-08-24 ENCOUNTER — OFFICE VISIT (OUTPATIENT)
Dept: URGENT CARE | Facility: URGENT CARE | Age: 61
End: 2021-08-24
Payer: COMMERCIAL

## 2021-08-24 VITALS
SYSTOLIC BLOOD PRESSURE: 119 MMHG | OXYGEN SATURATION: 99 % | RESPIRATION RATE: 12 BRPM | TEMPERATURE: 98.7 F | BODY MASS INDEX: 28.63 KG/M2 | HEART RATE: 84 BPM | HEIGHT: 70 IN | DIASTOLIC BLOOD PRESSURE: 84 MMHG | WEIGHT: 200 LBS

## 2021-08-24 DIAGNOSIS — R52 PAIN: Primary | ICD-10-CM

## 2021-08-24 DIAGNOSIS — R19.7 DIARRHEA, UNSPECIFIED TYPE: ICD-10-CM

## 2021-08-24 DIAGNOSIS — R10.819 SUPRAPUBIC TENDERNESS: ICD-10-CM

## 2021-08-24 DIAGNOSIS — R30.0 DYSURIA: ICD-10-CM

## 2021-08-24 LAB
ALBUMIN UR-MCNC: NEGATIVE MG/DL
APPEARANCE UR: CLEAR
BACTERIA #/AREA URNS HPF: ABNORMAL /HPF
BILIRUB UR QL STRIP: NEGATIVE
COLOR UR AUTO: YELLOW
GLUCOSE UR STRIP-MCNC: NEGATIVE MG/DL
HGB UR QL STRIP: ABNORMAL
KETONES UR STRIP-MCNC: NEGATIVE MG/DL
LEUKOCYTE ESTERASE UR QL STRIP: ABNORMAL
NITRATE UR QL: NEGATIVE
PH UR STRIP: 5.5 [PH] (ref 5–7)
RBC #/AREA URNS AUTO: ABNORMAL /HPF
SP GR UR STRIP: 1.01 (ref 1–1.03)
UROBILINOGEN UR STRIP-ACNC: 0.2 E.U./DL
WBC #/AREA URNS AUTO: ABNORMAL /HPF

## 2021-08-24 PROCEDURE — 99213 OFFICE O/P EST LOW 20 MIN: CPT | Performed by: FAMILY MEDICINE

## 2021-08-24 PROCEDURE — 87086 URINE CULTURE/COLONY COUNT: CPT | Performed by: FAMILY MEDICINE

## 2021-08-24 PROCEDURE — 81001 URINALYSIS AUTO W/SCOPE: CPT

## 2021-08-24 RX ORDER — CIPROFLOXACIN 500 MG/1
500 TABLET, FILM COATED ORAL 2 TIMES DAILY
Qty: 10 TABLET | Refills: 0 | Status: SHIPPED | OUTPATIENT
Start: 2021-08-24 | End: 2021-08-29

## 2021-08-24 ASSESSMENT — MIFFLIN-ST. JEOR: SCORE: 1713.44

## 2021-08-24 NOTE — PROGRESS NOTES
Chief Complaint   Patient presents with     Urgent Care     Prostate Check     mucus in stool, difficulty urinating, diarrhea and tenderness below beltlline.        Medical Decision Making:    ASSESMENT AND PLAN   Lnare was seen today for urgent care and prostate check.    Diagnoses and all orders for this visit:    Pain  -     UA Macro with Reflex to Micro and Culture - lab collect; Future  -     UA Macro with Reflex to Micro and Culture - lab collect  -     Urine Microscopic    Diarrhea, unspecified type    Dysuria    Suprapubic tenderness          Tylenol, Fluids and Rest  Discussed with patient to push electrolyte rich fluids consider doing the brat diet as diarrhea is not resolving advised patient to watch for another 2 days and consider doing a stool test.  Discussed with patient symptoms could be related to prostatitis but at this point with the UA findings would consider treating with antibiotic and if symptoms do not resolve should follow-up. Urine culture is pending. As has no fever chills or any nausea or throwing up I do not think CBC is needed at this point but reviewed with patient if notices chills or high fever should follow-up for further evaluation and treatment.  I have reviewed the nursing notes.    Differential Diagnosis:  UTI: UTI, Dysuria, STD, BPH and Prostatitis  Gastro: viral gastroenteritis, food poisoning and intestinal parasites    Review of the result(s) of each unique test     X-Ray was not done.    Time  spent on the date of the encounter doing chart review, review of test results, interpretation of tests, patient visit and documentation     see orders in Epic  Pt verbalized and agreed with the plan and is aware of the worsening symptoms for which would need to follow up .  Pt was stable during time of discharge from the clinic     SUBJECTIVE     Lanre Godwin is a 60 year old male presenting with a chief complaint of    Chief Complaint   Patient presents with     Urgent Care      "Prostate Check     mucus in stool, difficulty urinating, diarrhea and tenderness below beltlline.      UTI    Onset of symptoms was 1day(s).  Course of illness is waxing and waning  Severity moderate  Current and associated symptoms dysuria, frequency and suprapubic pain and pressure  Treatment and measures tried None  Predisposing factors include none  Patient denies rigors, flank pain, temperature > 101 degrees F. and vomiting          Gastro    Onset of symptoms was 1.5  day(s) ago.  Course of illness is same.    Severity mild  Current and Associated symptoms:  Diarrhea mucus in stool   Aggravating factors: eating.    Alleviating factors:not eating   Diarrhea: Yes  8 stools/day and is decreasing  Stools: loose  Vomitting: No  Appetite: fair  Risk factors: possible bad food exposure        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 (H) 2020     Skin cancer 2016     No current outpatient medications on file.     Social History     Tobacco Use     Smoking status: Former Smoker     Packs/day: 1.00     Types: Cigarettes, Cigarettes     Start date: 1980     Quit date: 2007     Years since quittin.6     Smokeless tobacco: Never Used   Substance Use Topics     Alcohol use: No     Family History   Problem Relation Age of Onset     Prostate Cancer Father      Anesthesia Reaction No family hx of          ROS:    10 point ROS of systems including Constitutional, Eyes, Respiratory, Cardiovascular,  Integumentary, Muscularskeletal, Psychiatric ,neurological were all negative except for pertinent positives noted in my HPI         OBJECTIVE:    /84   Pulse 84   Temp 98.7  F (37.1  C) (Oral)   Resp 12   Ht 1.778 m (5' 10\")   Wt 90.7 kg (200 lb)   SpO2 99%   BMI 28.70 kg/m    GENERAL APPEARANCE: healthy, alert and no distress  EYES: EOMI,  PERRL, " conjunctiva clear   mouth without ulcers, erythema or lesions, oral mucosa moist   NECK: supple, nontender, no lymphadenopathy  RESP: lungs clear to auscultation - no rales, rhonchi or wheezes  CV: regular rates and rhythm, normal S1 S2, no murmur noted  ABDOMEN:  soft, lower abd tender, no HSM or masses and bowel sounds normal  SKIN: no suspicious lesions or rashes  PSYCH: mentation appears normal  Physical Exam      (Note was completed, in part, with "[employer] voice-recognition software. Documentation reviewed, but some grammatical, spelling, and word errors may remain.)  Brigid Rivas MD on 8/24/2021 at 2:13 PM

## 2021-08-24 NOTE — PATIENT INSTRUCTIONS
Patient Education     Treating Diarrhea  Diarrhea happens when you have loose, watery, or frequent bowel movements. It is a common problem with many causes. Most cases of diarrhea clear up on their own. But certain cases may need treatment. Be sure to see your healthcare provider if your symptoms don't get better in a few days.   Getting relief  Treatment of diarrhea depends on its cause. Diarrhea caused by bacterial or parasite infection is often treated with antibiotics. Diarrhea caused by other factors, such as a stomach virus, often improves with simple home treatment. The tips below may also help ease your symptoms.       Drink plenty of fluids. This helps prevent too much fluid loss (dehydration). Water, clear soups, and electrolyte solutions are good choices. Don't take alcohol, coffee, tea, or milk. These can irritate your intestines and make symptoms worse.    Suck on ice chips if drinking makes you queasy.    Return to your normal diet slowly. You may want to eat bland foods at first, such as rice and toast. Also, you may need to stay away from certain foods for a while, such as dairy products. These can make symptoms worse. Ask your healthcare provider if there are any other foods you should stay away from.    If you were prescribed antibiotics, take them as directed.    Don't take anti-diarrhea medicines without asking your provider first.  Call your healthcare provider   Call your healthcare provider if you have any of the following:      A fever of 100.4  F ( 38.0 C) or higher, or as directed by your provider    Chills    Severe pain    Worsening diarrhea or diarrhea for more than 2 days    Bloody vomit or stool    Signs of dehydration (dizziness, dry mouth and tongue, rapid pulse, dark urine)  [employer] last reviewed this educational content on 6/1/2019 2000-2021 [employer] All rights reserved. This information is not intended as a substitute for professional medical care. Always  follow your healthcare professional's instructions.           Patient Education     Diarrhea with Uncertain Cause (Adult)    Diarrhea is when stools are loose and watery. This can be caused by:    Viral infections    Bacterial infections    Food poisoning    Parasites    Irritable bowel syndrome (IBS)    Inflammatory bowel diseases such as ulcerative colitis, Crohn's disease, and celiac disease    Food intolerance, such as to lactose, the sugar found in milk and milk products    Reaction to medicines like antibiotics, laxatives, cancer drugs, and antacids  Along with diarrhea, you may also have:    Abdominal pain and cramping    Nausea and vomiting    Loss of bowel control    Fever and chills    Bloody stools  In some cases, antibiotics may help to treat diarrhea. You may have a stool sample test. This is done to see what is causing your diarrhea, and if antibiotics will help treat it. The results of a stool sample test may take up to 2 days. The healthcare provider may not give you antibiotics until he or she has the stool test results.  Diarrhea can cause dehydration. This is the loss of too much water and other fluids from the body. When this occurs, body fluid must be replaced. This can be done with oral rehydration solutions. Oral rehydration solutions are available at drugstores and grocery stores without a prescription. Sports drinks are not the best choice if you are very dehydrated. They have too much sugar and not enough electrolytes.  Home care  Follow all instructions given by your healthcare provider. Rest at home for the next 24 hours, or until you feel better. Avoid caffeine, tobacco, and alcohol. These can make diarrhea, cramping, and pain worse.  If taking medicines:    Over-the-counter nausea and diarrhea medicines are generally OK unless you experience fever or blood stool. Check with your doctor first in those circumstances.    You may use acetaminophen or NSAID medicines like ibuprofen or  naproxen to reduce pain and fever. Don t use these if you have chronic liver or kidney disease, or ever had a stomach ulcer or gastrointestinal bleeding. Don't use NSAID medicines if you are already taking one for another condition (like arthritis) or are on daily aspirin therapy (such as for heart disease or after a stroke). Talk with your healthcare provider first.    If antibiotics were prescribed, be sure you take them until they are finished. Don t stop taking them even when you feel better. Antibiotics must be taken as a full course.  To prevent the spread of illness:    Remember that washing with soap and water and using alcohol-based  is the best way to prevent the spread of infection. Dry your hands with a single use towel (like a paper towel).    Clean the toilet after each use.    Wash your hands before eating.    Wash your hands before and after preparing food. Keep in mind that people with diarrhea or vomiting should not prepare food for others.    Wash your hands after using cutting boards, countertops, and knives that have been in contact with raw foods.    Wash and then peel fruits and vegetables.    Keep uncooked meats away from cooked and ready-to-eat foods.    Use a food thermometer when cooking. Cook poultry to at least 165 F (74 C). Cook ground meat (beef, veal, pork, lamb) to at least 160 F (71 C). Cook fresh beef, veal, lamb, and pork to at least 145 F (63 C).    Don t eat raw or undercooked eggs (poached or effie side up), poultry, meat, or unpasteurized milk and juices.  Food and drinks  The main goal while treating vomiting or diarrhea is to prevent dehydration. This is done by taking small amounts of liquids often.    Keep in mind that liquids are more important than food right now.    Drink only small amounts of liquids at a time.    Don t force yourself to eat, especially if you are having cramping, vomiting, or diarrhea. Don t eat large amounts at a time, even if you are  hungry.    If you eat, avoid fatty, greasy, spicy, or fried foods.    Don t eat dairy foods or drink milk if you have diarrhea. These can make diarrhea worse.  During the first 24 hours you can try:    Oral rehydration solutions.  Sports drinks may be used if you are not too dehydrated and are otherwise healthy.    Soft drinks without caffeine    Ginger ale    Water (plain or flavored)    Decaf tea or coffee    Clear broth, consommé, or bouillon    Gelatin, popsicles, or frozen fruit juice bars  The second 24 hours, if you are feeling better, you can add:    Hot cereal, plain toast, bread, rolls, or crackers    Plain noodles, rice, mashed potatoes, chicken noodle soup, or rice soup    Unsweetened canned fruit (no pineapple)    Bananas  As you recover:    Limit fat intake to less than 15 grams per day. Don t eat margarine, butter, oils, mayonnaise, sauces, gravies, fried foods, peanut butter, meat, poultry, or fish.    Limit fiber. Don t eat raw or cooked vegetables, fresh fruits except bananas, or bran cereals.    Limit caffeine and chocolate.    Limit dairy.    Don t use spices or seasonings except salt.    Go back to your normal diet over time, as you feel better and your symptoms improve.    If the symptoms come back, go back to a simple diet or clear liquids.  Follow-up care  Follow up with your healthcare provider, or as advised. If a stool sample was taken or cultures were done, call the healthcare provider for the results as instructed.  Call 911  Call 911 if you have any of these symptoms:    Trouble breathing    Confusion    Extreme drowsiness or trouble walking    Loss of consciousness    Rapid heart rate    Chest pain    Stiff neck    Seizure  When to seek medical advice  Call your healthcare provider right away if any of these occur:    Abdominal pain that gets worse    Constant lower right abdominal pain    Continued vomiting and inability to keep liquids down    Diarrhea more than 5 times a  day    Blood in vomit or stool    Dark urine or no urine for 8 hours, dry mouth and tongue, tiredness, weakness, or dizziness    Drowsiness    New rash    You don t get better in 2 to 3 days    Fever of 100.4 F (38 C) or higher, or as directed by your healthcare provider  [employer] last reviewed this educational content on 6/1/2018 2000-2021 [employer]. All rights reserved. This information is not intended as a substitute for professional medical care. Always follow your healthcare professional's instructions.

## 2021-08-25 LAB — BACTERIA UR CULT: NO GROWTH
